# Patient Record
Sex: FEMALE | Race: WHITE | NOT HISPANIC OR LATINO | Employment: FULL TIME | URBAN - METROPOLITAN AREA
[De-identification: names, ages, dates, MRNs, and addresses within clinical notes are randomized per-mention and may not be internally consistent; named-entity substitution may affect disease eponyms.]

---

## 2019-01-23 DIAGNOSIS — Z13.29 SCREENING FOR ENDOCRINE DISORDER: ICD-10-CM

## 2019-01-23 DIAGNOSIS — Z13.6 SCREENING FOR CARDIOVASCULAR CONDITION: Primary | ICD-10-CM

## 2019-01-23 DIAGNOSIS — Z13.220 SCREENING CHOLESTEROL LEVEL: ICD-10-CM

## 2019-01-25 ENCOUNTER — APPOINTMENT (OUTPATIENT)
Dept: LAB | Facility: HOSPITAL | Age: 29
End: 2019-01-25
Payer: COMMERCIAL

## 2019-01-25 DIAGNOSIS — Z13.220 SCREENING CHOLESTEROL LEVEL: ICD-10-CM

## 2019-01-25 DIAGNOSIS — Z13.6 SCREENING FOR CARDIOVASCULAR CONDITION: ICD-10-CM

## 2019-01-25 DIAGNOSIS — Z13.29 SCREENING FOR ENDOCRINE DISORDER: ICD-10-CM

## 2019-01-25 LAB
ALBUMIN SERPL BCP-MCNC: 4.4 G/DL (ref 3–5.2)
ALP SERPL-CCNC: 60 U/L (ref 43–122)
ALT SERPL W P-5'-P-CCNC: 21 U/L (ref 9–52)
ANION GAP SERPL CALCULATED.3IONS-SCNC: 8 MMOL/L (ref 5–14)
AST SERPL W P-5'-P-CCNC: 19 U/L (ref 14–36)
BILIRUB SERPL-MCNC: 0.5 MG/DL
BUN SERPL-MCNC: 12 MG/DL (ref 5–25)
CALCIUM SERPL-MCNC: 9.6 MG/DL (ref 8.4–10.2)
CHLORIDE SERPL-SCNC: 101 MMOL/L (ref 97–108)
CHOLEST SERPL-MCNC: 178 MG/DL
CO2 SERPL-SCNC: 29 MMOL/L (ref 22–30)
CREAT SERPL-MCNC: 0.69 MG/DL (ref 0.6–1.2)
EOSINOPHIL # BLD AUTO: 0.1 THOUSAND/UL (ref 0–0.4)
EOSINOPHIL NFR BLD MANUAL: 2 % (ref 0–6)
ERYTHROCYTE [DISTWIDTH] IN BLOOD BY AUTOMATED COUNT: 13 %
GFR SERPL CREATININE-BSD FRML MDRD: 119 ML/MIN/1.73SQ M
GLUCOSE P FAST SERPL-MCNC: 90 MG/DL (ref 70–99)
HCT VFR BLD AUTO: 42.7 % (ref 36–46)
HDLC SERPL-MCNC: 56 MG/DL (ref 40–59)
HGB BLD-MCNC: 13.9 G/DL (ref 12–16)
LDLC SERPL CALC-MCNC: 107 MG/DL
LYMPHOCYTES # BLD AUTO: 1.28 THOUSAND/UL (ref 0.5–4)
LYMPHOCYTES # BLD AUTO: 25 % (ref 25–45)
MCH RBC QN AUTO: 29.7 PG (ref 26–34)
MCHC RBC AUTO-ENTMCNC: 32.5 G/DL (ref 31–36)
MCV RBC AUTO: 91 FL (ref 80–100)
MONOCYTES # BLD AUTO: 0.66 THOUSAND/UL (ref 0.2–0.9)
MONOCYTES NFR BLD AUTO: 13 % (ref 1–10)
NEUTS SEG # BLD: 3.06 THOUSAND/UL (ref 1.8–7.8)
NEUTS SEG NFR BLD AUTO: 60 %
NONHDLC SERPL-MCNC: 122 MG/DL
PLATELET # BLD AUTO: 233 THOUSANDS/UL (ref 150–450)
PLATELET BLD QL SMEAR: ADEQUATE
PMV BLD AUTO: 8.3 FL (ref 8.9–12.7)
POTASSIUM SERPL-SCNC: 3.8 MMOL/L (ref 3.6–5)
PROT SERPL-MCNC: 7.8 G/DL (ref 5.9–8.4)
RBC # BLD AUTO: 4.67 MILLION/UL (ref 4–5.2)
RBC MORPH BLD: NORMAL
SODIUM SERPL-SCNC: 138 MMOL/L (ref 137–147)
TOTAL CELLS COUNTED SPEC: 100
TRIGL SERPL-MCNC: 74 MG/DL
TSH SERPL DL<=0.05 MIU/L-ACNC: 2.45 UIU/ML (ref 0.47–4.68)
WBC # BLD AUTO: 5.1 THOUSAND/UL (ref 4.5–11)

## 2019-01-25 PROCEDURE — 84443 ASSAY THYROID STIM HORMONE: CPT

## 2019-01-25 PROCEDURE — 85027 COMPLETE CBC AUTOMATED: CPT

## 2019-01-25 PROCEDURE — 36415 COLL VENOUS BLD VENIPUNCTURE: CPT

## 2019-01-25 PROCEDURE — 80061 LIPID PANEL: CPT

## 2019-01-25 PROCEDURE — 85007 BL SMEAR W/DIFF WBC COUNT: CPT

## 2019-01-25 PROCEDURE — 80053 COMPREHEN METABOLIC PANEL: CPT

## 2019-02-05 ENCOUNTER — OFFICE VISIT (OUTPATIENT)
Dept: FAMILY MEDICINE CLINIC | Facility: CLINIC | Age: 29
End: 2019-02-05
Payer: COMMERCIAL

## 2019-02-05 VITALS
HEART RATE: 83 BPM | HEIGHT: 63 IN | WEIGHT: 125 LBS | SYSTOLIC BLOOD PRESSURE: 120 MMHG | OXYGEN SATURATION: 97 % | RESPIRATION RATE: 16 BRPM | BODY MASS INDEX: 22.15 KG/M2 | DIASTOLIC BLOOD PRESSURE: 80 MMHG

## 2019-02-05 DIAGNOSIS — Z23 NEED FOR INFLUENZA VACCINATION: ICD-10-CM

## 2019-02-05 DIAGNOSIS — Z13.31 POSITIVE DEPRESSION SCREENING: ICD-10-CM

## 2019-02-05 DIAGNOSIS — Z12.4 SCREENING FOR MALIGNANT NEOPLASM OF CERVIX: ICD-10-CM

## 2019-02-05 DIAGNOSIS — Z00.01 ENCOUNTER FOR WELL ADULT EXAM WITH ABNORMAL FINDINGS: Primary | ICD-10-CM

## 2019-02-05 PROBLEM — L70.0 ACNE VULGARIS: Status: ACTIVE | Noted: 2017-12-05

## 2019-02-05 PROCEDURE — 99395 PREV VISIT EST AGE 18-39: CPT | Performed by: FAMILY MEDICINE

## 2019-02-05 RX ORDER — POLYMYXIN B SULFATE AND TRIMETHOPRIM 1; 10000 MG/ML; [USP'U]/ML
SOLUTION OPHTHALMIC
Refills: 0 | COMMUNITY
Start: 2019-01-17 | End: 2019-06-07 | Stop reason: ALTCHOICE

## 2019-02-05 RX ORDER — CLINDAMYCIN PHOSPHATE 10 MG/G
GEL TOPICAL EVERY 12 HOURS
COMMUNITY
Start: 2017-12-05 | End: 2019-07-10 | Stop reason: SDUPTHER

## 2019-02-05 NOTE — PROGRESS NOTES
FAMILY PRACTICE HEALTH MAINTENANCE OFFICE VISIT  [facility] - [place] PRIMARY [facility]    NAME: Collette Joseph  AGE: 29 y o  SEX: female  : 1990     DATE: 2019    Assessment and Plan     Problem List Items Addressed This Visit     Encounter for well adult exam with abnormal findings - Primary     Advice and education were given regarding nutrition, aerobic exercises, weight bearing exercises, cardiovascular risk reduction, fall risk reduction, and age appropriate supplements  The patient was counseled regarding instructions for management, risk factor reductions, prognosis, risks and benefits of treatment options, patient and family education, and importance of compliance with treatment  Screening for malignant neoplasm of cervix     Patient declined Pap smear         Positive depression screening     Discussed with the patient behavior therapy and to be refer to psychotherapist she will hold on that she will try to do more exercise and urogram therapy to help her with relaxation           Other Visit Diagnoses     Need for influenza vaccination        Patient declined flu shot            · Patient Counseling:   · Nutrition: Stressed importance of a well balanced diet, moderation of sodium/saturated fat, caloric balance and sufficient intake of fiber  · Exercise: Stressed the importance of regular exercise with a goal of 150 minutes per week  · Dental Health: Discussed daily flossing and brushing and regular dental visits     · Immunizations reviewed Patient is up-to-date with the Tdap she declined a flu shot for today  · Discussed benefits of screening  Patient is up-to-date with the blood work screening for diabetic and hyperlipidemia  BMI Counseling: Body mass index is 22 14 kg/m²  Discussed with patient's BMI with her  The BMI is in the acceptable range          Chief Complaint     Chief Complaint   Patient presents with    Physical Exam yearly        History of Present Illness     Patient and office for her annual physical exam patient deny any chest pain short of breath no palpitation no headache no blurred vision no head deny any dizziness no lateralized of the symptom and no cough no wheezing and deny any runny nose dryness in the eye no discharge from the eye deny any abdomen pain nausea vomiting or diarrhea no renal problem no rash no fever no change in the weight patient is not smoker no and she does not do exercise regularly she does follow regular diet  Depression screening was positive patient and she feel fluctuated in her mood specially in wintertime when it is dark and called outside and she does not feel depressed she does not feel fatigue or tired no sleeping problem no decrease in appetite she been stressed out the about her exam spent last time studying for it        Well Adult Physical   Patient here for a comprehensive physical exam       Diet and Physical Activity  Diet: Regular diet  Weight concerns: None, patient's BMI is between 18 5-24 9  Exercise: rarely      Depression Screen  PHQ-9 Depression Screening    PHQ-9:    Frequency of the following problems over the past two weeks:       Little interest or pleasure in doing things:  3 - nearly every day  Feeling down, depressed, or hopeless:  3 - nearly every day  Trouble falling or staying asleep, or sleeping too much:  1 - several days  Feeling tired or having little energy:  3 - nearly every day  Poor appetite or overeatin - not at all  Feeling bad about yourself - or that you are a failure or have let yourself or your family down:  0 - not at all  Trouble concentrating on things, such as reading the newspaper or watching television:  1 - several days  Moving or speaking so slowly that other people could have noticed   Or the opposite - being so fidgety or restless that you have been moving around a lot more than usual:  0 - not at all  Thoughts that you would be better off dead, or of hurting yourself in some way:  0 - not at all  PHQ-2 Score:  6  PHQ-9 Score:  11          General Health  Hearing: Normal:  bilateral  Vision: previous LASIK surgery  Dental: regular dental visits    Reproductive Health  LMP 01/27/19  No PAP smear yet      The following portions of the patient's history were reviewed and updated as appropriate: allergies, current medications, past family history, past medical history, past social history, past surgical history and problem list     Review of Systems     Review of Systems   Constitutional: Negative for fatigue and fever  HENT: Negative for ear pain, sinus pain, sinus pressure and sore throat  Eyes: Negative for pain and redness  Respiratory: Negative for cough, chest tightness and shortness of breath  Cardiovascular: Negative for chest pain, palpitations and leg swelling  Gastrointestinal: Negative for abdominal pain, blood in stool, constipation, diarrhea and nausea  Endocrine: Negative for heat intolerance  Genitourinary: Negative for flank pain, frequency and hematuria  Musculoskeletal: Negative for back pain and joint swelling  Skin: Negative for rash  Neurological: Negative for dizziness, numbness and headaches  Hematological: Does not bruise/bleed easily  Psychiatric/Behavioral: Negative for agitation and behavioral problems  Past Medical History     History reviewed  No pertinent past medical history      Past Surgical History     Past Surgical History:   Procedure Laterality Date    LASIK Bilateral 01/19/2019       Social History     Social History     Social History    Marital status: Single     Spouse name: N/A    Number of children: N/A    Years of education: N/A     Occupational History    sub teacher-fulltime      [employer]     Social History Main Topics    Smoking status: Never Smoker    Smokeless tobacco: Never Used    Alcohol use Yes    Drug use: No    Sexual activity: Not Currently     Other Topics Concern    None     Social History Narrative    None       Family History     Family History   Problem Relation Age of Onset    Stroke Father        Current Medications       Current Outpatient Prescriptions:     clindamycin (CLINDAGEL) 1 % gel, Every 12 hours, Disp: , Rfl:     polymyxin b-trimethoprim (POLYTRIM) ophthalmic solution, INSTILL 1 DROP INTO BOTH EYES 4 TIMES DAILY, Disp: , Rfl: 0     Allergies     No Known Allergies    Objective     /80 (BP Location: Left arm, Patient Position: Sitting, Cuff Size: Standard)   Pulse 83   Resp 16   Ht 5' 3" (1 6 m)   Wt 56 7 kg (125 lb)   LMP 01/31/2019 (Exact Date)   SpO2 97%   BMI 22 14 kg/m²      Physical Exam   Constitutional: She is oriented to person, place, and time  She appears well-developed and well-nourished  HENT:   Head: Normocephalic  Right Ear: External ear normal    Left Ear: External ear normal    Eyes: Conjunctivae and EOM are normal  Right eye exhibits no discharge  Left eye exhibits no discharge  Neck: No JVD present  Cardiovascular: Normal rate, regular rhythm and normal heart sounds  Exam reveals no gallop  No murmur heard  Pulmonary/Chest: Effort normal  No respiratory distress  She has no wheezes  She has no rales  She exhibits no tenderness  Abdominal: She exhibits no mass  There is no tenderness  There is no rebound  Musculoskeletal: She exhibits no edema or tenderness  Neurological: She is alert and oriented to person, place, and time  Skin: No rash noted  No erythema     Psychiatric: Her behavior is normal          Health Maintenance     Health Maintenance   Topic Date Due    INFLUENZA VACCINE  07/01/2018    Depression Screening PHQ  02/05/2020    DTaP,Tdap,and Td Vaccines (4 - Td) 10/03/2026     Immunization History   Administered Date(s) Administered    Hep B, Adolescent or Pediatric 11/01/1997, 01/10/1998, 06/16/1998    IPV 08/10/2005, 01/04/2006, 06/29/2006    Influenza 10/15/2015, 10/03/2016    MMR 08/10/2005, 01/04/2006    Td (adult), adsorbed 08/10/2005, 01/04/2006    Tdap 06/29/2006, 10/03/2016    Tuberculin Skin Test-PPD Intradermal 10/10/2012, 08/22/2014, 08/26/2016       Anish Boone MD  [address] [place] [address]

## 2019-02-06 NOTE — ASSESSMENT & PLAN NOTE
Discussed with the patient behavior therapy and to be refer to psychotherapist she will hold on that she will try to do more exercise and urogram therapy to help her with relaxation

## 2019-06-07 ENCOUNTER — OFFICE VISIT (OUTPATIENT)
Dept: FAMILY MEDICINE CLINIC | Facility: CLINIC | Age: 29
End: 2019-06-07
Payer: COMMERCIAL

## 2019-06-07 VITALS
DIASTOLIC BLOOD PRESSURE: 70 MMHG | OXYGEN SATURATION: 98 % | BODY MASS INDEX: 22.86 KG/M2 | TEMPERATURE: 98.2 F | HEART RATE: 85 BPM | HEIGHT: 63 IN | WEIGHT: 129 LBS | SYSTOLIC BLOOD PRESSURE: 100 MMHG

## 2019-06-07 DIAGNOSIS — I83.93 SPIDER VEINS OF BOTH LOWER EXTREMITIES: ICD-10-CM

## 2019-06-07 DIAGNOSIS — J30.89 SEASONAL ALLERGIC RHINITIS DUE TO OTHER ALLERGIC TRIGGER: Primary | ICD-10-CM

## 2019-06-07 DIAGNOSIS — B07.8 OTHER VIRAL WARTS: ICD-10-CM

## 2019-06-07 PROCEDURE — 17110 DESTRUCTION B9 LES UP TO 14: CPT | Performed by: FAMILY MEDICINE

## 2019-06-07 PROCEDURE — 99214 OFFICE O/P EST MOD 30 MIN: CPT | Performed by: FAMILY MEDICINE

## 2019-06-07 RX ORDER — FLUTICASONE PROPIONATE 50 MCG
2 SPRAY, SUSPENSION (ML) NASAL DAILY
Qty: 16 G | Refills: 0 | Status: SHIPPED | OUTPATIENT
Start: 2019-06-07 | End: 2020-06-05 | Stop reason: SDUPTHER

## 2019-06-07 RX ORDER — LORATADINE 10 MG/1
10 TABLET ORAL DAILY
Qty: 30 TABLET | Refills: 2 | Status: SHIPPED | OUTPATIENT
Start: 2019-06-07 | End: 2019-09-12 | Stop reason: SDUPTHER

## 2019-06-08 PROBLEM — J30.9 ALLERGIC RHINITIS DUE TO ALLERGEN: Status: ACTIVE | Noted: 2019-06-08

## 2019-06-08 PROBLEM — I83.93 SPIDER VEINS OF BOTH LOWER EXTREMITIES: Status: ACTIVE | Noted: 2019-06-08

## 2019-06-08 PROBLEM — B07.8 OTHER VIRAL WARTS: Status: ACTIVE | Noted: 2019-06-08

## 2019-06-17 ENCOUNTER — OFFICE VISIT (OUTPATIENT)
Dept: FAMILY MEDICINE CLINIC | Facility: CLINIC | Age: 29
End: 2019-06-17
Payer: COMMERCIAL

## 2019-06-17 VITALS
HEIGHT: 63 IN | SYSTOLIC BLOOD PRESSURE: 110 MMHG | DIASTOLIC BLOOD PRESSURE: 70 MMHG | WEIGHT: 128 LBS | HEART RATE: 94 BPM | TEMPERATURE: 98.5 F | OXYGEN SATURATION: 99 % | BODY MASS INDEX: 22.68 KG/M2

## 2019-06-17 DIAGNOSIS — B07.8 OTHER VIRAL WARTS: ICD-10-CM

## 2019-06-17 DIAGNOSIS — F41.0 PANIC ATTACK: Primary | ICD-10-CM

## 2019-06-17 DIAGNOSIS — Z12.4 PAP SMEAR FOR CERVICAL CANCER SCREENING: ICD-10-CM

## 2019-06-17 PROCEDURE — 1036F TOBACCO NON-USER: CPT | Performed by: FAMILY MEDICINE

## 2019-06-17 PROCEDURE — 99214 OFFICE O/P EST MOD 30 MIN: CPT | Performed by: FAMILY MEDICINE

## 2019-06-17 PROCEDURE — 3008F BODY MASS INDEX DOCD: CPT | Performed by: FAMILY MEDICINE

## 2019-06-17 RX ORDER — ALPRAZOLAM 0.25 MG/1
0.25 TABLET ORAL
Qty: 30 TABLET | Refills: 0 | Status: SHIPPED | OUTPATIENT
Start: 2019-06-17 | End: 2020-08-26 | Stop reason: SDUPTHER

## 2019-07-10 DIAGNOSIS — L70.0 ACNE VULGARIS: Primary | ICD-10-CM

## 2019-07-10 RX ORDER — CLINDAMYCIN PHOSPHATE 10 MG/G
GEL TOPICAL 2 TIMES DAILY
Qty: 30 G | Refills: 1 | Status: SHIPPED | OUTPATIENT
Start: 2019-07-10 | End: 2019-09-12 | Stop reason: ALTCHOICE

## 2019-09-12 ENCOUNTER — OFFICE VISIT (OUTPATIENT)
Dept: FAMILY MEDICINE CLINIC | Facility: CLINIC | Age: 29
End: 2019-09-12
Payer: COMMERCIAL

## 2019-09-12 VITALS
DIASTOLIC BLOOD PRESSURE: 60 MMHG | HEIGHT: 64 IN | OXYGEN SATURATION: 98 % | BODY MASS INDEX: 22.36 KG/M2 | HEART RATE: 73 BPM | WEIGHT: 131 LBS | TEMPERATURE: 98.2 F | SYSTOLIC BLOOD PRESSURE: 90 MMHG

## 2019-09-12 DIAGNOSIS — IMO0002 LUMP: Primary | ICD-10-CM

## 2019-09-12 DIAGNOSIS — J30.89 SEASONAL ALLERGIC RHINITIS DUE TO OTHER ALLERGIC TRIGGER: ICD-10-CM

## 2019-09-12 DIAGNOSIS — B07.8 OTHER VIRAL WARTS: ICD-10-CM

## 2019-09-12 PROCEDURE — 3008F BODY MASS INDEX DOCD: CPT | Performed by: FAMILY MEDICINE

## 2019-09-12 PROCEDURE — 99214 OFFICE O/P EST MOD 30 MIN: CPT | Performed by: FAMILY MEDICINE

## 2019-09-12 PROCEDURE — 17110 DESTRUCTION B9 LES UP TO 14: CPT | Performed by: FAMILY MEDICINE

## 2019-09-12 RX ORDER — LORATADINE 10 MG/1
10 TABLET ORAL DAILY
Qty: 30 TABLET | Refills: 2 | Status: SHIPPED | OUTPATIENT
Start: 2019-09-12 | End: 2020-10-05 | Stop reason: SDUPTHER

## 2019-09-12 NOTE — ASSESSMENT & PLAN NOTE
New diagnosis ,plan for US of lump discuss with the patient avoid itchy and so does not get infected or bleed

## 2019-09-12 NOTE — ASSESSMENT & PLAN NOTE
A chronic getting smaller in the size the we discuss Histofreeze the patient she is agree patient tolerated well without any complication

## 2019-09-20 ENCOUNTER — HOSPITAL ENCOUNTER (OUTPATIENT)
Dept: ULTRASOUND IMAGING | Facility: HOSPITAL | Age: 29
Discharge: HOME | End: 2019-09-20
Payer: COMMERCIAL

## 2019-09-20 DIAGNOSIS — IMO0002 LUMP: ICD-10-CM

## 2019-09-20 PROCEDURE — 76536 US EXAM OF HEAD AND NECK: CPT

## 2019-09-23 ENCOUNTER — TELEPHONE (OUTPATIENT)
Dept: FAMILY MEDICINE CLINIC | Facility: CLINIC | Age: 29
End: 2019-09-23

## 2019-09-23 DIAGNOSIS — L72.3 SEBACEOUS CYST: Primary | ICD-10-CM

## 2019-09-23 NOTE — TELEPHONE ENCOUNTER
----- Message from Doraine Buerger, MD sent at 9/22/2019  3:47 PM EDT -----  Refer to Surgeon for remove sebaceous cysts on scalp

## 2019-09-23 NOTE — TELEPHONE ENCOUNTER
Spoke with patient states she would prefer not to see the surgeon at this time will wait to see if it gets bigger because right now it has gotten smaller

## 2019-09-23 NOTE — TELEPHONE ENCOUNTER
left jeffrye for patient to return call regarding results   Order placed for Surgeon for sebaceous cyst on scalp

## 2019-10-22 ENCOUNTER — TELEPHONE (OUTPATIENT)
Dept: FAMILY MEDICINE CLINIC | Facility: CLINIC | Age: 29
End: 2019-10-22

## 2019-10-22 DIAGNOSIS — Z11.1 SCREENING FOR TUBERCULOSIS: Primary | ICD-10-CM

## 2019-10-22 NOTE — TELEPHONE ENCOUNTER
spoke with patient states she is starting a new employment needs a TB test done and a form filled out advised order will be placed and she can bring form in to office

## 2019-11-06 ENCOUNTER — TELEPHONE (OUTPATIENT)
Dept: FAMILY MEDICINE CLINIC | Facility: CLINIC | Age: 29
End: 2019-11-06

## 2019-11-07 ENCOUNTER — CONSULT (OUTPATIENT)
Dept: SURGERY | Facility: CLINIC | Age: 29
End: 2019-11-07
Payer: COMMERCIAL

## 2019-11-07 ENCOUNTER — APPOINTMENT (OUTPATIENT)
Dept: LAB | Facility: HOSPITAL | Age: 29
End: 2019-11-07
Payer: COMMERCIAL

## 2019-11-07 VITALS
TEMPERATURE: 97.6 F | BODY MASS INDEX: 23.5 KG/M2 | SYSTOLIC BLOOD PRESSURE: 118 MMHG | HEIGHT: 63 IN | WEIGHT: 132.6 LBS | DIASTOLIC BLOOD PRESSURE: 78 MMHG | HEART RATE: 99 BPM

## 2019-11-07 DIAGNOSIS — Z11.1 SCREENING FOR TUBERCULOSIS: ICD-10-CM

## 2019-11-07 DIAGNOSIS — F41.0 PANIC ATTACK: Primary | ICD-10-CM

## 2019-11-07 DIAGNOSIS — L72.3 SEBACEOUS CYST: Primary | ICD-10-CM

## 2019-11-07 PROCEDURE — 21011 EXC FACE LES SC <2 CM: CPT | Performed by: SURGERY

## 2019-11-07 PROCEDURE — 86480 TB TEST CELL IMMUN MEASURE: CPT

## 2019-11-07 PROCEDURE — 36415 COLL VENOUS BLD VENIPUNCTURE: CPT

## 2019-11-07 PROCEDURE — 99203 OFFICE O/P NEW LOW 30 MIN: CPT | Performed by: SURGERY

## 2019-11-08 LAB
GAMMA INTERFERON BACKGROUND BLD IA-ACNC: 0.03 IU/ML
M TB IFN-G BLD-IMP: NEGATIVE
M TB IFN-G CD4+ BCKGRND COR BLD-ACNC: 0 IU/ML
M TB IFN-G CD4+ BCKGRND COR BLD-ACNC: 0 IU/ML
MITOGEN IGNF BCKGRD COR BLD-ACNC: >10 IU/ML

## 2019-11-08 NOTE — TELEPHONE ENCOUNTER
Left message on machine referral entered for behavioral health number left to schedule patient can also contact insurance for participating provider

## 2019-11-08 NOTE — PROGRESS NOTES
Assessment/Plan:    Sebaceous cyst  - excision done in office    - follow in 1-2 weeks   -  Wound instructions given  Subjective:      Patient ID: Ector Perera is a 34 y o  female with 2 palpable masses on her head that she noticed over the summer  They have progressively since that time and now hurt when she barcenas her hair  No other symptoms at this time  HPI    The following portions of the patient's history were reviewed and updated as appropriate: allergies, current medications, past family history, past medical history, past social history, past surgical history and problem list     Review of Systems   Constitutional: Negative  HENT:        As noted in HPI   Eyes: Negative  Respiratory: Negative  Gastrointestinal: Negative  Genitourinary: Negative  Musculoskeletal: Negative  Objective:      /78   Pulse 99   Temp 97 6 °F (36 4 °C) (Tympanic)   Ht 5' 3" (1 6 m)   Wt 60 1 kg (132 lb 9 6 oz)   BMI 23 49 kg/m²          Physical Exam   Constitutional: She appears well-nourished  No distress  HENT:   1 cm cyst on left occipital scalp - minimally tender, mobile, rubbery  0 75 cm cyst on right frontoparietal scalp - minimally tender, mobile, rubbery   Eyes: Pupils are equal, round, and reactive to light  EOM are normal    Neck: Neck supple  No tracheal deviation present  No thyromegaly present  Cardiovascular: Normal rate and intact distal pulses  Pulmonary/Chest: Effort normal  No stridor  No respiratory distress  The patient was placed prone   The head was prepped and draped in standard fashion  Local anesthesia was used to anesthetize the skin surrounding a 2 lesions on scalp  The left occipital lesion was 1 cm and the right frontoparietal lesion was   A transverse incision was made over both  lesions  Sharp and blunt dissection,Using scissors, knife, and cautery, were used to mobilize the mass which was in a subcutaneous location   The masses were easily enucleated with their capsules intact    Hemostasis was achieved with direct pressure  The wound was irrigated  The wounds wer closed  a 4-0 Monocryl subcuticular stitch  The specimen sizes:  #1  - Left occipital scalp  - 1x1 cm  #2  - Right frontoparietal scalp - 0 75 x 0 5 cm  The specimens were not sent to pathology as they were clearly benign sebaceous cysts    At the end of the operation, all sponge, instrument, and needle counts were correct

## 2019-11-22 ENCOUNTER — OFFICE VISIT (OUTPATIENT)
Dept: SURGERY | Facility: CLINIC | Age: 29
End: 2019-11-22

## 2019-11-22 VITALS — TEMPERATURE: 96.6 F | HEIGHT: 63 IN | WEIGHT: 134 LBS | HEART RATE: 81 BPM | BODY MASS INDEX: 23.74 KG/M2

## 2019-11-22 DIAGNOSIS — L72.9 SCALP CYST: Primary | ICD-10-CM

## 2019-11-22 PROCEDURE — 99024 POSTOP FOLLOW-UP VISIT: CPT | Performed by: SURGERY

## 2019-11-22 NOTE — PROGRESS NOTES
Office Visit - General Surgery  Ashia Mike MRN: [id number]  Encounter: [id number]    Assessment and Plan    Problem List Items Addressed This Visit        Musculoskeletal and Integument    Scalp cyst - Primary     S/p excision of scalp cyst x2    Healing well  Ok to shower / wash hair  Follow-up PRN               Chief Complaint:  Ashia Mike is a 34 y o  female who presents for Suture / Staple Removal (Suture removal from scalp)    Subjective  29F s/p excision of scalp masses x2 on 11/7 in the office  Pt doing well, no complaints  Past Medical History  Past Medical History:   Diagnosis Date    Panic attack 6/17/2019       Past Surgical History  Past Surgical History:   Procedure Laterality Date    LASIK Bilateral 01/19/2019       Family History  Family History   Problem Relation Age of Onset    Stroke Father        Social History  Social History     Socioeconomic History    Marital status: Single     Spouse name: None    Number of children: None    Years of education: None    Highest education level: None   Occupational History    Occupation: sub teacher-fulltime     Comment: employer[employer]   Social Needs    Financial resource strain: None    Food insecurity:     Worry: None     Inability: None    Transportation needs:     Medical: None     Non-medical: None   Tobacco Use    Smoking status: Never Smoker    Smokeless tobacco: Never Used   Substance and Sexual Activity    Alcohol use:  Yes    Drug use: No    Sexual activity: Not Currently   Lifestyle    Physical activity:     Days per week: None     Minutes per session: None    Stress: None   Relationships    Social connections:     Talks on phone: None     Gets together: None     Attends Roman [employer] service: None     Active member of club or organization: None     Attends meetings of clubs or organizations: None     Relationship status: None    Intimate partner violence:     Fear of current or ex partner: None     Emotionally abused: None Physically abused: None     Forced sexual activity: None   Other Topics Concern    None   Social History Narrative    None        Medications  Current Outpatient Medications on File Prior to Visit   Medication Sig Dispense Refill    ALPRAZolam (XANAX) 0 25 mg tablet Take 1 tablet (0 25 mg total) by mouth daily at bedtime as needed for anxiety 30 tablet 0    fluticasone (FLONASE) 50 mcg/act nasal spray 2 sprays into each nostril daily 16 g 0    loratadine (CLARITIN) 10 mg tablet Take 1 tablet (10 mg total) by mouth daily 30 tablet 2     No current facility-administered medications on file prior to visit          Allergies  No Known Allergies    Review of Systems  As per hpi    Objective  Vitals:    11/22/19 1001   Pulse: 81   Temp: (!) 96 6 °F (35 9 °C)       Physical Exam   NAD, aox4  Incisions x2 healing well without erythema/drainage

## 2020-01-20 NOTE — PROGRESS NOTES
Subjective:   Chief Complaint   Patient presents with    Other     bump/pimple on scalp        Patient ID: Judi Patricia is a 34 y o  female  Patient and office concerned about the lump on her scalp area and this lump comes and goes get bigger and smaller it has been going on for more than 6 months it is not painful not red the the but she feel it when she come her hair deny any head trauma and no fever no blurred vision no weakness no headache no numbness no lateralized of the symptom and no rash  Patient also concerned about the wart on her left forearm and she did have a treatment Histofreeze it get smaller but still there it is not painful and no change in the size but the get the irritated the with her jewelry and will watch      The following portions of the patient's history were reviewed and updated as appropriate: allergies, current medications, past family history, past medical history, past social history, past surgical history and problem list     Review of Systems   Constitutional: Negative for fatigue and fever  HENT: Negative for ear pain, sinus pressure, sinus pain and sore throat  Eyes: Negative for pain and redness  Respiratory: Negative for cough, chest tightness and shortness of breath  Cardiovascular: Negative for chest pain, palpitations and leg swelling  Gastrointestinal: Negative for abdominal pain, blood in stool, constipation, diarrhea and nausea  Genitourinary: Negative for flank pain, frequency and hematuria  Musculoskeletal: Negative for back pain and joint swelling  Skin: Negative for rash  Lump on the scalp  Wart on the left forearm   Neurological: Negative for dizziness, numbness and headaches  Hematological: Does not bruise/bleed easily           Objective:  Vitals:    09/12/19 1457   BP: 90/60   Pulse: 73   Temp: 98 2 °F (36 8 °C)   TempSrc: Tympanic   SpO2: 98%   Weight: 59 4 kg (131 lb)   Height: 5' 3 5" (1 613 m)      Physical Exam Constitutional: She is oriented to person, place, and time  She appears well-developed and well-nourished  HENT:   Head: Normocephalic  Right Ear: External ear normal    Left Ear: External ear normal    Eyes: Conjunctivae and EOM are normal  Right eye exhibits no discharge  Left eye exhibits no discharge  Neck: No JVD present  Cardiovascular: Normal rate, regular rhythm and normal heart sounds  Exam reveals no gallop  No murmur heard  Pulmonary/Chest: Effort normal  No respiratory distress  She has no wheezes  She has no rales  She exhibits no tenderness  Abdominal: She exhibits no mass  There is no tenderness  There is no rebound  Musculoskeletal: She exhibits no edema or tenderness  Neurological: She is alert and oriented to person, place, and time  Skin: No rash noted  No erythema  Assessment/Plan:    Lump  New diagnosis ,plan for US of lump discuss with the patient avoid itchy and so does not get infected or bleed    Other viral warts  A chronic getting smaller in the size the we discuss Histofreeze the patient she is agree patient tolerated well without any complication    Lesion Destruction  Date/Time: 9/12/2019 3:27 PM  Performed by: Nick Brannon MD  Authorized by: Nick Brannon MD     Procedure Details - Lesion Destruction:     Number of Lesions:  2  Lesion 1:     Body area:  Upper extremity    Upper extremity location:  L lower arm    Initial size (mm):  2    Malignancy: benign lesion      Destruction method: cryotherapy    Lesion 2:     Body area:  Upper extremity    Upper extremity location:  L upper arm    Initial size (mm):  1    Malignancy: benign lesion         Diagnoses and all orders for this visit:    Lump  -     US head neck soft tissue; Future    Seasonal allergic rhinitis due to other allergic trigger  -     loratadine (CLARITIN) 10 mg tablet;  Take 1 tablet (10 mg total) by mouth daily    Other viral warts    Other orders  -     Lesion Destruction Yes

## 2020-02-17 ENCOUNTER — TELEPHONE (OUTPATIENT)
Dept: FAMILY MEDICINE CLINIC | Facility: CLINIC | Age: 30
End: 2020-02-17

## 2020-02-17 NOTE — LETTER
February 17, 2020     Linh Pawan   [address] [facility] [address]      Dear Ms Acevedo: In addition to helping you feel better when you are sick, we are interested in preventing illness and injury in the first place  In the spirit of maintaining your good health, our system indicates that you are due for the following:    Physical Exam       Please call us to make an appointment at your earliest convenience  I look forward to seeing you soon          Sincerely,         Branden Amanda MD

## 2020-04-02 ENCOUNTER — TELEPHONE (OUTPATIENT)
Dept: FAMILY MEDICINE CLINIC | Facility: CLINIC | Age: 30
End: 2020-04-02

## 2020-05-19 ENCOUNTER — TELEPHONE (OUTPATIENT)
Dept: FAMILY MEDICINE CLINIC | Facility: CLINIC | Age: 30
End: 2020-05-19

## 2020-06-01 ENCOUNTER — TELEPHONE (OUTPATIENT)
Dept: FAMILY MEDICINE CLINIC | Facility: CLINIC | Age: 30
End: 2020-06-01

## 2020-06-01 DIAGNOSIS — Z00.00 ROUTINE ADULT HEALTH MAINTENANCE: ICD-10-CM

## 2020-06-01 DIAGNOSIS — Z13.6 SCREENING FOR HYPERTENSION: ICD-10-CM

## 2020-06-01 DIAGNOSIS — Z13.220 SCREENING CHOLESTEROL LEVEL: ICD-10-CM

## 2020-06-01 DIAGNOSIS — Z13.6 SCREENING FOR CARDIOVASCULAR CONDITION: Primary | ICD-10-CM

## 2020-06-01 DIAGNOSIS — Z13.1 ENCOUNTER FOR SCREENING FOR DIABETES MELLITUS: ICD-10-CM

## 2020-06-02 ENCOUNTER — APPOINTMENT (OUTPATIENT)
Dept: LAB | Facility: HOSPITAL | Age: 30
End: 2020-06-02
Payer: COMMERCIAL

## 2020-06-02 DIAGNOSIS — Z13.6 SCREENING FOR HYPERTENSION: ICD-10-CM

## 2020-06-02 DIAGNOSIS — Z00.00 ROUTINE ADULT HEALTH MAINTENANCE: ICD-10-CM

## 2020-06-02 DIAGNOSIS — Z13.6 SCREENING FOR CARDIOVASCULAR CONDITION: ICD-10-CM

## 2020-06-02 DIAGNOSIS — Z13.220 SCREENING CHOLESTEROL LEVEL: ICD-10-CM

## 2020-06-02 DIAGNOSIS — Z13.1 ENCOUNTER FOR SCREENING FOR DIABETES MELLITUS: ICD-10-CM

## 2020-06-02 LAB
ANION GAP SERPL CALCULATED.3IONS-SCNC: 3 MMOL/L (ref 4–13)
BASOPHILS # BLD AUTO: 0.04 THOUSANDS/ΜL (ref 0–0.1)
BASOPHILS NFR BLD AUTO: 1 % (ref 0–1)
BUN SERPL-MCNC: 12 MG/DL (ref 5–25)
CALCIUM SERPL-MCNC: 9.6 MG/DL (ref 8.3–10.1)
CHLORIDE SERPL-SCNC: 108 MMOL/L (ref 100–108)
CO2 SERPL-SCNC: 25 MMOL/L (ref 21–32)
CREAT SERPL-MCNC: 0.68 MG/DL (ref 0.6–1.3)
EOSINOPHIL # BLD AUTO: 0.17 THOUSAND/ΜL (ref 0–0.61)
EOSINOPHIL NFR BLD AUTO: 3 % (ref 0–6)
ERYTHROCYTE [DISTWIDTH] IN BLOOD BY AUTOMATED COUNT: 12.5 % (ref 11.6–15.1)
GFR SERPL CREATININE-BSD FRML MDRD: 118 ML/MIN/1.73SQ M
GLUCOSE P FAST SERPL-MCNC: 84 MG/DL (ref 65–99)
HCT VFR BLD AUTO: 39.4 % (ref 34.8–46.1)
HGB BLD-MCNC: 12.7 G/DL (ref 11.5–15.4)
IMM GRANULOCYTES # BLD AUTO: 0.01 THOUSAND/UL (ref 0–0.2)
IMM GRANULOCYTES NFR BLD AUTO: 0 % (ref 0–2)
LYMPHOCYTES # BLD AUTO: 1.56 THOUSANDS/ΜL (ref 0.6–4.47)
LYMPHOCYTES NFR BLD AUTO: 26 % (ref 14–44)
MCH RBC QN AUTO: 29.3 PG (ref 26.8–34.3)
MCHC RBC AUTO-ENTMCNC: 32.2 G/DL (ref 31.4–37.4)
MCV RBC AUTO: 91 FL (ref 82–98)
MONOCYTES # BLD AUTO: 0.42 THOUSAND/ΜL (ref 0.17–1.22)
MONOCYTES NFR BLD AUTO: 7 % (ref 4–12)
NEUTROPHILS # BLD AUTO: 3.89 THOUSANDS/ΜL (ref 1.85–7.62)
NEUTS SEG NFR BLD AUTO: 63 % (ref 43–75)
NRBC BLD AUTO-RTO: 0 /100 WBCS
PLATELET # BLD AUTO: 224 THOUSANDS/UL (ref 149–390)
PMV BLD AUTO: 10.2 FL (ref 8.9–12.7)
POTASSIUM SERPL-SCNC: 3.8 MMOL/L (ref 3.5–5.3)
RBC # BLD AUTO: 4.33 MILLION/UL (ref 3.81–5.12)
SODIUM SERPL-SCNC: 136 MMOL/L (ref 136–145)
WBC # BLD AUTO: 6.09 THOUSAND/UL (ref 4.31–10.16)

## 2020-06-02 PROCEDURE — 36415 COLL VENOUS BLD VENIPUNCTURE: CPT

## 2020-06-02 PROCEDURE — 80048 BASIC METABOLIC PNL TOTAL CA: CPT

## 2020-06-02 PROCEDURE — 85025 COMPLETE CBC W/AUTO DIFF WBC: CPT

## 2020-06-05 ENCOUNTER — APPOINTMENT (OUTPATIENT)
Dept: LAB | Facility: MEDICAL CENTER | Age: 30
End: 2020-06-05
Payer: COMMERCIAL

## 2020-06-05 ENCOUNTER — OFFICE VISIT (OUTPATIENT)
Dept: FAMILY MEDICINE CLINIC | Facility: CLINIC | Age: 30
End: 2020-06-05
Payer: COMMERCIAL

## 2020-06-05 VITALS
WEIGHT: 124 LBS | HEART RATE: 93 BPM | DIASTOLIC BLOOD PRESSURE: 64 MMHG | TEMPERATURE: 98.1 F | HEIGHT: 63 IN | SYSTOLIC BLOOD PRESSURE: 108 MMHG | BODY MASS INDEX: 21.97 KG/M2 | OXYGEN SATURATION: 96 % | RESPIRATION RATE: 16 BRPM

## 2020-06-05 DIAGNOSIS — R53.83 OTHER FATIGUE: ICD-10-CM

## 2020-06-05 DIAGNOSIS — F41.0 PANIC ATTACK: ICD-10-CM

## 2020-06-05 DIAGNOSIS — J30.89 SEASONAL ALLERGIC RHINITIS DUE TO OTHER ALLERGIC TRIGGER: ICD-10-CM

## 2020-06-05 DIAGNOSIS — Z00.01 ENCOUNTER FOR WELL ADULT EXAM WITH ABNORMAL FINDINGS: Primary | ICD-10-CM

## 2020-06-05 PROBLEM — Z00.00 ROUTINE MEDICAL EXAM: Status: ACTIVE | Noted: 2020-06-05

## 2020-06-05 PROBLEM — Z13.31 POSITIVE DEPRESSION SCREENING: Status: RESOLVED | Noted: 2019-02-05 | Resolved: 2020-06-05

## 2020-06-05 LAB
25(OH)D3 SERPL-MCNC: 16.4 NG/ML (ref 30–100)
TSH SERPL DL<=0.05 MIU/L-ACNC: 0.96 UIU/ML (ref 0.36–3.74)
VIT B12 SERPL-MCNC: 542 PG/ML (ref 100–900)

## 2020-06-05 PROCEDURE — 1036F TOBACCO NON-USER: CPT | Performed by: FAMILY MEDICINE

## 2020-06-05 PROCEDURE — 86617 LYME DISEASE ANTIBODY: CPT | Performed by: FAMILY MEDICINE

## 2020-06-05 PROCEDURE — 99214 OFFICE O/P EST MOD 30 MIN: CPT | Performed by: FAMILY MEDICINE

## 2020-06-05 PROCEDURE — 82607 VITAMIN B-12: CPT

## 2020-06-05 PROCEDURE — 99395 PREV VISIT EST AGE 18-39: CPT | Performed by: FAMILY MEDICINE

## 2020-06-05 PROCEDURE — 84443 ASSAY THYROID STIM HORMONE: CPT

## 2020-06-05 PROCEDURE — 3008F BODY MASS INDEX DOCD: CPT | Performed by: FAMILY MEDICINE

## 2020-06-05 PROCEDURE — 82306 VITAMIN D 25 HYDROXY: CPT

## 2020-06-05 PROCEDURE — 86038 ANTINUCLEAR ANTIBODIES: CPT | Performed by: FAMILY MEDICINE

## 2020-06-05 PROCEDURE — 86618 LYME DISEASE ANTIBODY: CPT | Performed by: FAMILY MEDICINE

## 2020-06-05 PROCEDURE — 36415 COLL VENOUS BLD VENIPUNCTURE: CPT | Performed by: FAMILY MEDICINE

## 2020-06-05 RX ORDER — FLUTICASONE PROPIONATE 50 MCG
2 SPRAY, SUSPENSION (ML) NASAL DAILY
Qty: 16 G | Refills: 2 | Status: SHIPPED | OUTPATIENT
Start: 2020-06-05 | End: 2020-10-05 | Stop reason: SDUPTHER

## 2020-06-06 PROBLEM — R53.83 OTHER FATIGUE: Status: ACTIVE | Noted: 2020-06-06

## 2020-06-06 PROBLEM — Z00.01 ENCOUNTER FOR WELL ADULT EXAM WITH ABNORMAL FINDINGS: Status: ACTIVE | Noted: 2020-06-05

## 2020-06-06 PROBLEM — Z00.01 ENCOUNTER FOR WELL ADULT EXAM WITH ABNORMAL FINDINGS: Status: ACTIVE | Noted: 2020-06-06

## 2020-06-06 PROBLEM — R53.83 OTHER FATIGUE: Status: ACTIVE | Noted: 2020-06-05

## 2020-06-08 ENCOUNTER — TELEPHONE (OUTPATIENT)
Dept: FAMILY MEDICINE CLINIC | Facility: CLINIC | Age: 30
End: 2020-06-08

## 2020-06-08 LAB
B BURGDOR IGG PATRN SER IB-IMP: NEGATIVE
B BURGDOR IGG+IGM SER-ACNC: 0.96 ISR (ref 0–0.9)
B BURGDOR IGM PATRN SER IB-IMP: NEGATIVE
B BURGDOR18KD IGG SER QL IB: NORMAL
B BURGDOR23KD IGG SER QL IB: NORMAL
B BURGDOR23KD IGM SER QL IB: NORMAL
B BURGDOR28KD IGG SER QL IB: NORMAL
B BURGDOR30KD IGG SER QL IB: NORMAL
B BURGDOR39KD IGG SER QL IB: NORMAL
B BURGDOR39KD IGM SER QL IB: NORMAL
B BURGDOR41KD IGG SER QL IB: NORMAL
B BURGDOR41KD IGM SER QL IB: NORMAL
B BURGDOR45KD IGG SER QL IB: NORMAL
B BURGDOR58KD IGG SER QL IB: NORMAL
B BURGDOR66KD IGG SER QL IB: NORMAL
B BURGDOR93KD IGG SER QL IB: NORMAL
RYE IGE QN: NEGATIVE

## 2020-06-09 DIAGNOSIS — E55.9 VITAMIN D DEFICIENCY: Primary | ICD-10-CM

## 2020-06-09 RX ORDER — ERGOCALCIFEROL 1.25 MG/1
50000 CAPSULE ORAL WEEKLY
Qty: 12 CAPSULE | Refills: 0 | Status: SHIPPED | OUTPATIENT
Start: 2020-06-09 | End: 2020-11-03 | Stop reason: ALTCHOICE

## 2020-07-15 ENCOUNTER — TELEPHONE (OUTPATIENT)
Dept: FAMILY MEDICINE CLINIC | Facility: CLINIC | Age: 30
End: 2020-07-15

## 2020-07-15 NOTE — TELEPHONE ENCOUNTER
pc from the business office stating they needed additional codes for the patients lab work that was drawn on 6/5/2020  States the b12 and vitamin d is not covering, I reviewed the notes and advised them we do not have any thing additional unfortunately that can help with the passing of these labs

## 2020-08-25 ENCOUNTER — TELEPHONE (OUTPATIENT)
Dept: FAMILY MEDICINE CLINIC | Facility: CLINIC | Age: 30
End: 2020-08-25

## 2020-08-25 DIAGNOSIS — E55.9 VITAMIN D DEFICIENCY: Primary | ICD-10-CM

## 2020-08-25 RX ORDER — MULTIVIT-MIN/IRON/FOLIC ACID/K 18-600-40
1 CAPSULE ORAL DAILY
Qty: 30 CAPSULE | Refills: 2 | Status: SHIPPED | OUTPATIENT
Start: 2020-08-25 | End: 2020-12-19

## 2020-08-26 DIAGNOSIS — F41.0 PANIC ATTACK: ICD-10-CM

## 2020-08-26 RX ORDER — ALPRAZOLAM 0.25 MG/1
0.25 TABLET ORAL
Qty: 10 TABLET | Refills: 0 | Status: SHIPPED | OUTPATIENT
Start: 2020-08-26 | End: 2021-07-14 | Stop reason: DRUGHIGH

## 2020-08-26 NOTE — TELEPHONE ENCOUNTER
Incoming request for only ten pills she only takes it as needed original script gave her 30 which  she only requesting 10

## 2020-08-26 NOTE — TELEPHONE ENCOUNTER
06/17/2019  1   06/17/2019  Alprazolam 0 25 MG Tablet  30 00 30 [facility]   [id number]   Pen ([id number])   0   [place]   [place]   01/17/2019  1   01/17/2019  Diazepam 5 MG Tablet  2 00 1 [facility]   [id number]   Pen ([id number])   0   Comm Ins

## 2020-10-02 ENCOUNTER — NURSE TRIAGE (OUTPATIENT)
Dept: OTHER | Facility: OTHER | Age: 30
End: 2020-10-02

## 2020-10-02 DIAGNOSIS — Z20.828 SARS-ASSOCIATED CORONAVIRUS EXPOSURE: ICD-10-CM

## 2020-10-02 DIAGNOSIS — Z20.828 SARS-ASSOCIATED CORONAVIRUS EXPOSURE: Primary | ICD-10-CM

## 2020-10-02 PROCEDURE — U0003 INFECTIOUS AGENT DETECTION BY NUCLEIC ACID (DNA OR RNA); SEVERE ACUTE RESPIRATORY SYNDROME CORONAVIRUS 2 (SARS-COV-2) (CORONAVIRUS DISEASE [COVID-19]), AMPLIFIED PROBE TECHNIQUE, MAKING USE OF HIGH THROUGHPUT TECHNOLOGIES AS DESCRIBED BY CMS-2020-01-R: HCPCS | Performed by: FAMILY MEDICINE

## 2020-10-03 LAB — SARS-COV-2 RNA SPEC QL NAA+PROBE: NOT DETECTED

## 2020-10-05 ENCOUNTER — TELEPHONE (OUTPATIENT)
Dept: FAMILY MEDICINE CLINIC | Facility: CLINIC | Age: 30
End: 2020-10-05

## 2020-10-05 DIAGNOSIS — J30.89 SEASONAL ALLERGIC RHINITIS DUE TO OTHER ALLERGIC TRIGGER: ICD-10-CM

## 2020-10-05 RX ORDER — FLUTICASONE PROPIONATE 50 MCG
2 SPRAY, SUSPENSION (ML) NASAL DAILY
Qty: 16 G | Refills: 2 | Status: SHIPPED | OUTPATIENT
Start: 2020-10-05 | End: 2021-07-07

## 2020-10-05 RX ORDER — LORATADINE 10 MG/1
10 TABLET ORAL DAILY
Qty: 30 TABLET | Refills: 2 | Status: SHIPPED | OUTPATIENT
Start: 2020-10-05 | End: 2021-07-07 | Stop reason: ALTCHOICE

## 2020-11-03 ENCOUNTER — OFFICE VISIT (OUTPATIENT)
Dept: FAMILY MEDICINE CLINIC | Facility: CLINIC | Age: 30
End: 2020-11-03
Payer: COMMERCIAL

## 2020-11-03 VITALS
WEIGHT: 130 LBS | OXYGEN SATURATION: 97 % | TEMPERATURE: 98.1 F | HEART RATE: 84 BPM | BODY MASS INDEX: 23.04 KG/M2 | SYSTOLIC BLOOD PRESSURE: 120 MMHG | HEIGHT: 63 IN | DIASTOLIC BLOOD PRESSURE: 80 MMHG

## 2020-11-03 DIAGNOSIS — Z13.220 SCREENING CHOLESTEROL LEVEL: ICD-10-CM

## 2020-11-03 DIAGNOSIS — R53.83 OTHER FATIGUE: ICD-10-CM

## 2020-11-03 DIAGNOSIS — B07.8 OTHER VIRAL WARTS: ICD-10-CM

## 2020-11-03 DIAGNOSIS — E55.9 VITAMIN D DEFICIENCY: ICD-10-CM

## 2020-11-03 DIAGNOSIS — K58.0 IRRITABLE BOWEL SYNDROME WITH DIARRHEA: Primary | ICD-10-CM

## 2020-11-03 DIAGNOSIS — Z28.21 IMMUNIZATION REFUSED: ICD-10-CM

## 2020-11-03 PROCEDURE — 99214 OFFICE O/P EST MOD 30 MIN: CPT | Performed by: FAMILY MEDICINE

## 2020-11-03 PROCEDURE — 3008F BODY MASS INDEX DOCD: CPT | Performed by: FAMILY MEDICINE

## 2020-11-03 PROCEDURE — 3725F SCREEN DEPRESSION PERFORMED: CPT | Performed by: FAMILY MEDICINE

## 2020-11-03 RX ORDER — DICYCLOMINE HYDROCHLORIDE 10 MG/1
10 CAPSULE ORAL
Qty: 60 CAPSULE | Refills: 2 | Status: SHIPPED | OUTPATIENT
Start: 2020-11-03 | End: 2020-11-09 | Stop reason: SDUPTHER

## 2020-11-04 ENCOUNTER — APPOINTMENT (OUTPATIENT)
Dept: LAB | Facility: CLINIC | Age: 30
End: 2020-11-04
Payer: COMMERCIAL

## 2020-11-04 DIAGNOSIS — K58.0 IRRITABLE BOWEL SYNDROME WITH DIARRHEA: ICD-10-CM

## 2020-11-04 DIAGNOSIS — Z13.220 SCREENING CHOLESTEROL LEVEL: ICD-10-CM

## 2020-11-04 DIAGNOSIS — R53.83 OTHER FATIGUE: ICD-10-CM

## 2020-11-04 DIAGNOSIS — E55.9 VITAMIN D DEFICIENCY: ICD-10-CM

## 2020-11-04 LAB
25(OH)D3 SERPL-MCNC: 35.4 NG/ML (ref 30–100)
ALBUMIN SERPL BCP-MCNC: 3.8 G/DL (ref 3.5–5)
ALP SERPL-CCNC: 70 U/L (ref 46–116)
ALT SERPL W P-5'-P-CCNC: 19 U/L (ref 12–78)
ANION GAP SERPL CALCULATED.3IONS-SCNC: 5 MMOL/L (ref 4–13)
AST SERPL W P-5'-P-CCNC: 12 U/L (ref 5–45)
BASOPHILS # BLD AUTO: 0.03 THOUSANDS/ΜL (ref 0–0.1)
BASOPHILS NFR BLD AUTO: 1 % (ref 0–1)
BILIRUB SERPL-MCNC: 0.48 MG/DL (ref 0.2–1)
BUN SERPL-MCNC: 10 MG/DL (ref 5–25)
CALCIUM SERPL-MCNC: 9.2 MG/DL (ref 8.3–10.1)
CHLORIDE SERPL-SCNC: 110 MMOL/L (ref 100–108)
CHOLEST SERPL-MCNC: 163 MG/DL (ref 50–200)
CO2 SERPL-SCNC: 25 MMOL/L (ref 21–32)
CREAT SERPL-MCNC: 0.73 MG/DL (ref 0.6–1.3)
EOSINOPHIL # BLD AUTO: 0.17 THOUSAND/ΜL (ref 0–0.61)
EOSINOPHIL NFR BLD AUTO: 3 % (ref 0–6)
ERYTHROCYTE [DISTWIDTH] IN BLOOD BY AUTOMATED COUNT: 12.4 % (ref 11.6–15.1)
GFR SERPL CREATININE-BSD FRML MDRD: 111 ML/MIN/1.73SQ M
GLUCOSE P FAST SERPL-MCNC: 85 MG/DL (ref 65–99)
HCT VFR BLD AUTO: 39.5 % (ref 34.8–46.1)
HDLC SERPL-MCNC: 66 MG/DL
HGB BLD-MCNC: 13.1 G/DL (ref 11.5–15.4)
IMM GRANULOCYTES # BLD AUTO: 0.02 THOUSAND/UL (ref 0–0.2)
IMM GRANULOCYTES NFR BLD AUTO: 0 % (ref 0–2)
LDLC SERPL CALC-MCNC: 82 MG/DL (ref 0–100)
LYMPHOCYTES # BLD AUTO: 1.57 THOUSANDS/ΜL (ref 0.6–4.47)
LYMPHOCYTES NFR BLD AUTO: 25 % (ref 14–44)
MCH RBC QN AUTO: 30.2 PG (ref 26.8–34.3)
MCHC RBC AUTO-ENTMCNC: 33.2 G/DL (ref 31.4–37.4)
MCV RBC AUTO: 91 FL (ref 82–98)
MONOCYTES # BLD AUTO: 0.38 THOUSAND/ΜL (ref 0.17–1.22)
MONOCYTES NFR BLD AUTO: 6 % (ref 4–12)
NEUTROPHILS # BLD AUTO: 4.15 THOUSANDS/ΜL (ref 1.85–7.62)
NEUTS SEG NFR BLD AUTO: 65 % (ref 43–75)
NRBC BLD AUTO-RTO: 0 /100 WBCS
PLATELET # BLD AUTO: 244 THOUSANDS/UL (ref 149–390)
PMV BLD AUTO: 10.3 FL (ref 8.9–12.7)
POTASSIUM SERPL-SCNC: 3.8 MMOL/L (ref 3.5–5.3)
PROT SERPL-MCNC: 7.4 G/DL (ref 6.4–8.2)
RBC # BLD AUTO: 4.34 MILLION/UL (ref 3.81–5.12)
SODIUM SERPL-SCNC: 140 MMOL/L (ref 136–145)
TRIGL SERPL-MCNC: 74 MG/DL
TSH SERPL DL<=0.05 MIU/L-ACNC: 2.17 UIU/ML (ref 0.36–3.74)
WBC # BLD AUTO: 6.32 THOUSAND/UL (ref 4.31–10.16)

## 2020-11-04 PROCEDURE — 84443 ASSAY THYROID STIM HORMONE: CPT

## 2020-11-04 PROCEDURE — 86255 FLUORESCENT ANTIBODY SCREEN: CPT

## 2020-11-04 PROCEDURE — 80053 COMPREHEN METABOLIC PANEL: CPT

## 2020-11-04 PROCEDURE — 80061 LIPID PANEL: CPT

## 2020-11-04 PROCEDURE — 36415 COLL VENOUS BLD VENIPUNCTURE: CPT

## 2020-11-04 PROCEDURE — 85025 COMPLETE CBC W/AUTO DIFF WBC: CPT

## 2020-11-04 PROCEDURE — 82306 VITAMIN D 25 HYDROXY: CPT

## 2020-11-05 PROBLEM — Z28.21 IMMUNIZATION REFUSED: Status: ACTIVE | Noted: 2020-11-03

## 2020-11-05 PROBLEM — Z28.21 IMMUNIZATION REFUSED: Status: ACTIVE | Noted: 2020-11-05

## 2020-11-05 PROBLEM — E55.9 VITAMIN D DEFICIENCY: Status: ACTIVE | Noted: 2020-11-03

## 2020-11-05 PROBLEM — E55.9 VITAMIN D DEFICIENCY: Status: ACTIVE | Noted: 2020-11-05

## 2020-11-05 LAB — ENDOMYSIUM IGA SER QL: NEGATIVE

## 2020-11-09 ENCOUNTER — TELEPHONE (OUTPATIENT)
Dept: FAMILY MEDICINE CLINIC | Facility: CLINIC | Age: 30
End: 2020-11-09

## 2020-11-09 DIAGNOSIS — K58.0 IRRITABLE BOWEL SYNDROME WITH DIARRHEA: ICD-10-CM

## 2020-11-09 RX ORDER — DICYCLOMINE HYDROCHLORIDE 10 MG/1
10 CAPSULE ORAL
Qty: 180 CAPSULE | Refills: 0 | Status: SHIPPED | OUTPATIENT
Start: 2020-11-09 | End: 2021-03-22 | Stop reason: ALTCHOICE

## 2020-11-12 ENCOUNTER — TELEMEDICINE (OUTPATIENT)
Dept: FAMILY MEDICINE CLINIC | Facility: CLINIC | Age: 30
End: 2020-11-12
Payer: COMMERCIAL

## 2020-11-12 VITALS — TEMPERATURE: 98.1 F

## 2020-11-12 DIAGNOSIS — J02.9 SORE THROAT: Primary | ICD-10-CM

## 2020-11-12 DIAGNOSIS — Z20.822 EXPOSURE TO COVID-19 VIRUS: ICD-10-CM

## 2020-11-12 PROCEDURE — 1036F TOBACCO NON-USER: CPT | Performed by: FAMILY MEDICINE

## 2020-11-12 PROCEDURE — 99213 OFFICE O/P EST LOW 20 MIN: CPT | Performed by: FAMILY MEDICINE

## 2020-11-12 PROCEDURE — U0003 INFECTIOUS AGENT DETECTION BY NUCLEIC ACID (DNA OR RNA); SEVERE ACUTE RESPIRATORY SYNDROME CORONAVIRUS 2 (SARS-COV-2) (CORONAVIRUS DISEASE [COVID-19]), AMPLIFIED PROBE TECHNIQUE, MAKING USE OF HIGH THROUGHPUT TECHNOLOGIES AS DESCRIBED BY CMS-2020-01-R: HCPCS | Performed by: FAMILY MEDICINE

## 2020-11-14 LAB — SARS-COV-2 RNA SPEC QL NAA+PROBE: NOT DETECTED

## 2020-11-16 ENCOUNTER — TELEPHONE (OUTPATIENT)
Dept: FAMILY MEDICINE CLINIC | Facility: CLINIC | Age: 30
End: 2020-11-16

## 2020-12-18 DIAGNOSIS — E55.9 VITAMIN D DEFICIENCY: ICD-10-CM

## 2020-12-19 RX ORDER — CALCIUM CARBONATE/VITAMIN D3 600 MG-20
TABLET ORAL
Qty: 30 CAPSULE | Refills: 2 | Status: SHIPPED | OUTPATIENT
Start: 2020-12-19 | End: 2021-03-17

## 2021-01-12 ENCOUNTER — LAB (OUTPATIENT)
Dept: LAB | Facility: HOSPITAL | Age: 31
End: 2021-01-12
Payer: COMMERCIAL

## 2021-01-12 ENCOUNTER — HOSPITAL ENCOUNTER (OUTPATIENT)
Dept: ULTRASOUND IMAGING | Facility: HOSPITAL | Age: 31
Discharge: HOME | End: 2021-01-12
Payer: COMMERCIAL

## 2021-01-12 ENCOUNTER — OFFICE VISIT (OUTPATIENT)
Dept: OBGYN CLINIC | Facility: CLINIC | Age: 31
End: 2021-01-12
Payer: COMMERCIAL

## 2021-01-12 VITALS
WEIGHT: 130.6 LBS | HEIGHT: 64 IN | BODY MASS INDEX: 22.3 KG/M2 | DIASTOLIC BLOOD PRESSURE: 62 MMHG | SYSTOLIC BLOOD PRESSURE: 110 MMHG

## 2021-01-12 DIAGNOSIS — N92.6 IRREGULAR MENSES: Primary | ICD-10-CM

## 2021-01-12 DIAGNOSIS — N92.6 IRREGULAR MENSES: ICD-10-CM

## 2021-01-12 DIAGNOSIS — Z71.85 HPV VACCINE COUNSELING: ICD-10-CM

## 2021-01-12 PROBLEM — L72.9 SCALP CYST: Status: RESOLVED | Noted: 2019-11-22 | Resolved: 2021-01-12

## 2021-01-12 LAB
ESTRADIOL SERPL-MCNC: 42 PG/ML
FSH SERPL-ACNC: 4.9 MIU/ML
LH SERPL-ACNC: 17.4 MIU/ML
PROLACTIN SERPL-MCNC: 8.7 NG/ML
TSH SERPL DL<=0.05 MIU/L-ACNC: 0.87 UIU/ML (ref 0.47–4.68)

## 2021-01-12 PROCEDURE — 90471 IMMUNIZATION ADMIN: CPT

## 2021-01-12 PROCEDURE — 84443 ASSAY THYROID STIM HORMONE: CPT

## 2021-01-12 PROCEDURE — 76856 US EXAM PELVIC COMPLETE: CPT

## 2021-01-12 PROCEDURE — 83001 ASSAY OF GONADOTROPIN (FSH): CPT

## 2021-01-12 PROCEDURE — 36415 COLL VENOUS BLD VENIPUNCTURE: CPT

## 2021-01-12 PROCEDURE — 90651 9VHPV VACCINE 2/3 DOSE IM: CPT

## 2021-01-12 PROCEDURE — 84146 ASSAY OF PROLACTIN: CPT

## 2021-01-12 PROCEDURE — 3008F BODY MASS INDEX DOCD: CPT | Performed by: OBSTETRICS & GYNECOLOGY

## 2021-01-12 PROCEDURE — 82670 ASSAY OF TOTAL ESTRADIOL: CPT

## 2021-01-12 PROCEDURE — 1036F TOBACCO NON-USER: CPT | Performed by: OBSTETRICS & GYNECOLOGY

## 2021-01-12 PROCEDURE — 83002 ASSAY OF GONADOTROPIN (LH): CPT

## 2021-01-12 PROCEDURE — 99203 OFFICE O/P NEW LOW 30 MIN: CPT | Performed by: OBSTETRICS & GYNECOLOGY

## 2021-01-12 NOTE — PROGRESS NOTES
Patient is a 27 y o  No obstetric history on file  with Patient's last menstrual period was 01/02/2021  who presents requesting evaluation of irregular menses  She reports this has been present for the last 1-2 years  She reports the last few months have been better  She reports she achieved menarche age 16-14  She reports she used to have painful menses but it was monthly  She reports she has had to leave work several times due to the pain  She reports that she had to used maxipads every 2-3 hours for the first two days and then every 4-5 hours  Her menses last 5 days  She reports she typically uses Pamprin for her pain  She reports in the last 1-2 years she has had increased anxiety and stress and symptoms of depression  She reports her menses have been ranging from 20-40 days and would still last 5 days  She reports that for the last several months her pain has improved  She reports for the last 2-3 months they cycles are regular again and still last 5 days and the flow is similar but she no longer needs pamprin  She just wants to make sure everything is ok  She reports that she has been taking herbal medications (3) for emotional strength and for claustrophobia,but she does not know what they are called  She reports that since taking the herbs her menses have been irregular but her pain has improved  Pt advised without knowing what the herbs are I don't know if they are causing menstrual irregularities  Pt had TSH in June and her TSH was 0 965  She started her herbs in August and TSH in November was 2 170  Will recheck in addition to PRL, E2, [facility],     Upon questioning, pt has never had HPV vaccination and has never been sexually active  Reviewed risks and benefits and pt desires to proceed  First dose given        Past Medical History:   Diagnosis Date    Chickenpox     HPV vaccine counseling     never had    Panic attack 6/17/2019    Positive depression screening 2/5/2019    Scalp cyst 2019    removed       Past Surgical History:   Procedure Laterality Date    LASIK Bilateral 2019    WISDOM TOOTH EXTRACTION         OB History    Para Term  AB Living   0 0 0 0 0 0   SAB TAB Ectopic Multiple Live Births   0 0 0 0 0   Obstetric Comments   Menarche; [age]           Current Outpatient Medications:     ALPRAZolam (XANAX) 0 25 mg tablet, Take 1 tablet (0 25 mg total) by mouth daily at bedtime as needed for anxiety, Disp: 10 tablet, Rfl: 0    CVS D3 50 MCG (2000 UT) CAPS, TAKE 1 CAPSULE BY MOUTH EVERY DAY, Disp: 30 capsule, Rfl: 2    fluticasone (FLONASE) 50 mcg/act nasal spray, 2 sprays into each nostril daily, Disp: 16 g, Rfl: 2    loratadine (Claritin) 10 mg tablet, Take 1 tablet (10 mg total) by mouth daily, Disp: 30 tablet, Rfl: 2    CASCARA SAGRADA PO, Take 1 tablet by mouth, Disp: , Rfl:     dicyclomine (BENTYL) 10 mg capsule, Take 1 capsule (10 mg total) by mouth 2 (two) times a day (Patient not taking: Reported on 2021), Disp: 180 capsule, Rfl: 0    No Known Allergies    Social History     Socioeconomic History    Marital status: Single     Spouse name: None    Number of children: 0    Years of education: None    Highest education level: Master's degree (e g , MA, MS, Hugh, MEd, MSW, CRAIG)   Occupational History    Occupation: teacher     Comment: [employer]   Social Needs    Financial resource strain: None    Food insecurity     Worry: Never true     Inability: Never true    Transportation needs     Medical: No     Non-medical: No   Tobacco Use    Smoking status: Never Smoker    Smokeless tobacco: Never Used   Substance and Sexual Activity    Alcohol use:  Yes     Alcohol/week: 0 0 - 1 0 standard drinks     Frequency: Monthly or less     Drinks per session: 1 or 2     Comment: holidays    Drug use: No    Sexual activity: Never   Lifestyle    Physical activity     Days per week: 1 day     Minutes per session: 30 min    Stress: Not at all Relationships    Social connections     Talks on phone: More than three times a week     Gets together: More than three times a week     Attends Restorationism service: Never     Active member of club or organization: No     Attends meetings of clubs or organizations: Never     Relationship status: Never     Intimate partner violence     Fear of current or ex partner: No     Emotionally abused: No     Physically abused: No     Forced sexual activity: No   Other Topics Concern    None   Social History Narrative    Nondenominational: French Jainism    Accepts blood products       Family History   Problem Relation Age of Onset    Stroke Father     Sarcoidosis Mother     No Known Problems Sister     Diabetes Maternal Grandmother     Hypertension Maternal Grandmother     Diabetes Maternal Grandfather     Hypertension Maternal Grandfather     Stomach cancer Paternal Grandmother     Breast cancer Neg Hx     Ovarian cancer Neg Hx     Colon cancer Neg Hx        Review of Systems   Constitutional: Negative for chills, fatigue, fever and unexpected weight change  HENT: Negative for congestion, mouth sores and sore throat  Respiratory: Negative for cough, chest tightness, shortness of breath and wheezing  Cardiovascular: Negative for chest pain and palpitations  Gastrointestinal: Positive for constipation (IBS) and diarrhea (IBS)  Negative for abdominal distention, abdominal pain, nausea and vomiting  Endocrine: Negative for cold intolerance and heat intolerance  Genitourinary: Positive for menstrual problem  Negative for dyspareunia, dysuria, genital sores, pelvic pain, vaginal bleeding, vaginal discharge and vaginal pain  Musculoskeletal: Negative for arthralgias  Skin: Negative for color change and rash  Neurological: Negative for dizziness, light-headedness and headaches  Hematological: Negative for adenopathy         Blood pressure 110/62, height 5' 3 5" (1 613 m), weight 59 2 kg (130 lb 9 6 mario), last menstrual period 01/02/2021, not currently breastfeeding  and Body mass index is 22 77 kg/m²  Physical Exam  Constitutional:       Appearance: Normal appearance  She is well-developed and normal weight  HENT:      Head: Normocephalic and atraumatic  Eyes:      Extraocular Movements: Extraocular movements intact  Conjunctiva/sclera: Conjunctivae normal    Neck:      Musculoskeletal: Normal range of motion and neck supple  Thyroid: No thyromegaly  Trachea: No tracheal deviation  Cardiovascular:      Rate and Rhythm: Normal rate and regular rhythm  Heart sounds: Normal heart sounds  Pulmonary:      Effort: Pulmonary effort is normal  No respiratory distress  Breath sounds: Normal breath sounds  No stridor  No wheezing or rales  Abdominal:      General: Bowel sounds are normal  There is no distension  Palpations: Abdomen is soft  There is no mass  Tenderness: There is no abdominal tenderness  There is no guarding or rebound  Hernia: No hernia is present  Musculoskeletal: Normal range of motion  General: No tenderness  Lymphadenopathy:      Cervical: No cervical adenopathy  Skin:     General: Skin is warm  Findings: No erythema or rash  Neurological:      Mental Status: She is alert and oriented to person, place, and time  Psychiatric:         Mood and Affect: Mood normal          Behavior: Behavior normal          Thought Content: Thought content normal          Judgment: Judgment normal          Pt declines pelvic examination as she has never been sexually active  A/P:  Pt is a 27 y o  No obstetric history on file  with      Diagnoses and all orders for this visit:    Irregular menses  -     Estradiol; Future  -     Follicle stimulating hormone; Future  -     Luteinizing hormone; Future  -     Prolactin; Future  -     TSH, 3rd generation with Free T4 reflex; Future  -     US pelvis transabdominal only;  Future    HPV vaccine counseling  -     HPV VACCINE 9 VALENT IM      Pt will send us what herbs she is taking so we can see if this is affecting her menses

## 2021-01-14 ENCOUNTER — TELEPHONE (OUTPATIENT)
Dept: OBGYN CLINIC | Facility: CLINIC | Age: 31
End: 2021-01-14

## 2021-01-14 NOTE — TELEPHONE ENCOUNTER
Patient LM on triage line for ultrasound and blood work results  I LMOM that ultrasound results are not in patients chart as she had her ultrasound 1/12/21  Patient aware Dr Bisi Yu will send her a my chart message or give her a call when results are in chart

## 2021-02-18 ENCOUNTER — TELEPHONE (OUTPATIENT)
Dept: FAMILY MEDICINE CLINIC | Facility: CLINIC | Age: 31
End: 2021-02-18

## 2021-02-18 NOTE — TELEPHONE ENCOUNTER
Patient called regarding alprazolam  States she only uses medication when traveling  Wants to know what is the max amount she is able to take in a day?

## 2021-02-24 NOTE — TELEPHONE ENCOUNTER
Patient notified  States she only wants to use this medication when she travels but taking the alprazolam once does not help her when she travels  Wants to know how many pills she can take only when she travels?

## 2021-02-24 NOTE — TELEPHONE ENCOUNTER
patiernt on Xanax 0 25 mg po ,she can take one tablets twice or tid the day before travel if need it

## 2021-03-15 ENCOUNTER — CLINICAL SUPPORT (OUTPATIENT)
Dept: OBGYN CLINIC | Facility: CLINIC | Age: 31
End: 2021-03-15
Payer: COMMERCIAL

## 2021-03-15 VITALS — HEIGHT: 64 IN | WEIGHT: 133.2 LBS | BODY MASS INDEX: 22.74 KG/M2

## 2021-03-15 DIAGNOSIS — Z23 NEED FOR HPV VACCINATION: Primary | ICD-10-CM

## 2021-03-15 PROCEDURE — 3008F BODY MASS INDEX DOCD: CPT | Performed by: NURSE PRACTITIONER

## 2021-03-15 PROCEDURE — 90471 IMMUNIZATION ADMIN: CPT | Performed by: OBSTETRICS & GYNECOLOGY

## 2021-03-15 PROCEDURE — 90651 9VHPV VACCINE 2/3 DOSE IM: CPT | Performed by: OBSTETRICS & GYNECOLOGY

## 2021-03-15 PROCEDURE — 96372 THER/PROPH/DIAG INJ SC/IM: CPT | Performed by: OBSTETRICS & GYNECOLOGY

## 2021-03-15 NOTE — PROGRESS NOTES
Pt here in office today for her 2nd benji vaccine  Pt tolerated injection in left upper arm without any complications, has her 3rd injection scheduled      Chana Chiang 47: [id number]  LOT: [id number]  EXP: 06/21/2022

## 2021-03-17 DIAGNOSIS — E55.9 VITAMIN D DEFICIENCY: ICD-10-CM

## 2021-03-17 RX ORDER — CALCIUM CARBONATE/VITAMIN D3 600 MG-20
TABLET ORAL
Qty: 90 CAPSULE | Refills: 0 | Status: SHIPPED | OUTPATIENT
Start: 2021-03-17 | End: 2021-06-19

## 2021-03-22 ENCOUNTER — TELEMEDICINE (OUTPATIENT)
Dept: FAMILY MEDICINE CLINIC | Facility: CLINIC | Age: 31
End: 2021-03-22
Payer: COMMERCIAL

## 2021-03-22 ENCOUNTER — TELEPHONE (OUTPATIENT)
Dept: FAMILY MEDICINE CLINIC | Facility: CLINIC | Age: 31
End: 2021-03-22

## 2021-03-22 DIAGNOSIS — Z20.822 EXPOSURE TO COVID-19 VIRUS: ICD-10-CM

## 2021-03-22 DIAGNOSIS — R51.9 NONINTRACTABLE HEADACHE, UNSPECIFIED CHRONICITY PATTERN, UNSPECIFIED HEADACHE TYPE: ICD-10-CM

## 2021-03-22 DIAGNOSIS — Z20.822 CONTACT WITH AND (SUSPECTED) EXPOSURE TO COVID-19: Primary | ICD-10-CM

## 2021-03-22 DIAGNOSIS — R51.9 NONINTRACTABLE HEADACHE, UNSPECIFIED CHRONICITY PATTERN, UNSPECIFIED HEADACHE TYPE: Primary | ICD-10-CM

## 2021-03-22 PROCEDURE — 3725F SCREEN DEPRESSION PERFORMED: CPT | Performed by: NURSE PRACTITIONER

## 2021-03-22 PROCEDURE — U0005 INFEC AGEN DETEC AMPLI PROBE: HCPCS | Performed by: NURSE PRACTITIONER

## 2021-03-22 PROCEDURE — U0003 INFECTIOUS AGENT DETECTION BY NUCLEIC ACID (DNA OR RNA); SEVERE ACUTE RESPIRATORY SYNDROME CORONAVIRUS 2 (SARS-COV-2) (CORONAVIRUS DISEASE [COVID-19]), AMPLIFIED PROBE TECHNIQUE, MAKING USE OF HIGH THROUGHPUT TECHNOLOGIES AS DESCRIBED BY CMS-2020-01-R: HCPCS | Performed by: NURSE PRACTITIONER

## 2021-03-22 PROCEDURE — 99213 OFFICE O/P EST LOW 20 MIN: CPT | Performed by: NURSE PRACTITIONER

## 2021-03-22 NOTE — PROGRESS NOTES
COVID-19 Virtual Visit     Assessment/Plan:    Problem List Items Addressed This Visit        Other    Exposure to COVID-19 virus     Acute symptomatic patient started with body aches, chills, headache, fatigue, diarrhea, nausea, and sore throat 3/19/2021  Patient had known covid exposure to sister and YAZAN who tested positive  Last contact was 3/18/2021  Recommend report for covid testing, increase fluids, vit c, vit d, zinc, and otc tylenol prn  Patient to quarantine accordingly until test results return and further instructions given  Patient to call with worsening of symptoms such as shortness of breath, chest pain, or increasing temperature  Patient verbalized understanding and agrees with treatment plan  [address]    Your healthcare provider and/or public health staff have evaluated you and have determined that you do not need to remain in the hospital at this time  At this time you can be isolated at home where you will be monitored by staff from your local or state health department  You should carefully follow the prevention and isolation steps below until a healthcare provider or local or state health department says that you can return to your normal activities  Stay home except to get medical care    People who are mildly ill with COVID-19 are able to isolate at home during their illness  You should restrict activities outside your home, except for getting medical care  Do not go to work, school, or public areas  Avoid using public transportation, ride-sharing, or taxis  Separate yourself from other people and animals in your home    People: As much as possible, you should stay in a specific room and away from other people in your home  Also, you should use a separate bathroom, if available  Animals: You should restrict contact with pets and other animals while you are sick with COVID-19, just like you would around other people   Although there have not been reports of pets or other animals becoming sick with COVID-19, it is still recommended that people sick with COVID-19 limit contact with animals until more information is known about the virus  When possible, have another member of your household care for your animals while you are sick  If you are sick with COVID-19, avoid contact with your pet, including petting, snuggling, being kissed or licked, and sharing food  If you must care for your pet or be around animals while you are sick, wash your hands before and after you interact with pets and wear a facemask  See COVID-19 and Animals for more information  Call ahead before visiting your doctor    If you have a medical appointment, call the healthcare provider and tell them that you have or may have COVID-19  This will help the healthcare providers office take steps to keep other people from getting infected or exposed  Wear a facemask    You should wear a facemask when you are around other people (e g , sharing a room or vehicle) or pets and before you enter a healthcare providers office  If you are not able to wear a facemask (for example, because it causes trouble breathing), then people who live with you should not stay in the same room with you, or they should wear a facemask if they enter your room  Cover your coughs and sneezes    Cover your mouth and nose with a tissue when you cough or sneeze  Throw used tissues in a lined trash can  Immediately wash your hands with soap and water for at least 20 seconds or, if soap and water are not available, clean your hands with an alcohol-based hand  that contains at least 60% alcohol  Clean your hands often    Wash your hands often with soap and water for at least 20 seconds, especially after blowing your nose, coughing, or sneezing; going to the bathroom; and before eating or preparing food   If soap and water are not readily available, use an alcohol-based hand  with at least 60% alcohol, covering all surfaces of your hands and rubbing them together until they feel dry  Soap and water are the best option if hands are visibly dirty  Avoid touching your eyes, nose, and mouth with unwashed hands  Avoid sharing personal household items    You should not share dishes, drinking glasses, cups, eating utensils, towels, or bedding with other people or pets in your home  After using these items, they should be washed thoroughly with soap and water  Clean all high-touch surfaces everyday    High touch surfaces include counters, tabletops, doorknobs, bathroom fixtures, toilets, phones, keyboards, tablets, and bedside tables  Also, clean any surfaces that may have blood, stool, or body fluids on them  Use a household cleaning spray or wipe, according to the label instructions  Labels contain instructions for safe and effective use of the cleaning product including precautions you should take when applying the product, such as wearing gloves and making sure you have good ventilation during use of the product  Monitor your symptoms    Seek prompt medical attention if your illness is worsening (e g , difficulty breathing)  Before seeking care, call your healthcare provider and tell them that you have, or are being evaluated for, COVID-19  Put on a facemask before you enter the facility  These steps will help the healthcare providers office to keep other people in the office or waiting room from getting infected or exposed  Ask your healthcare provider to call the local or [facility] health department  Persons who are placed under active monitoring or facilitated self-monitoring should follow instructions provided by their local health department or occupational health professionals, as appropriate  If you have a medical emergency and need to call 911, notify the dispatch personnel that you have, or are being evaluated for COVID-19   If possible, put on a facemask before emergency medical services arrive  Discontinuing home isolation    Patients with confirmed COVID-19 should remain under home isolation precautions until the following conditions are met:   - They have had no fever for at least 24 hours (that is one full day of no fever without the use medicine that reduces fevers)  AND  - other symptoms have improved (for example, when their cough or shortness of breath have improved)  AND  - If had mild or moderate illness, at least 10 days have passed since their symptoms first appeared or if severe illness (needed oxygen) or immunosuppressed, at least 20 days have passed since symptoms first appeared  Patients with confirmed COVID-19 should also notify close contacts (including their workplace) and ask that they self-quarantine  Currently, close contact is defined as being within 6 feet for 15 minutes or more from the period 24 hours starting 48 hours before symptom onset to the time at which the patient went into isolation  Close contacts of patients diagnosed with COVID-19 should be instructed by the patient to self-quarantine for 14 days from the last time of their last contact with the patient  Source: [employer]           Relevant Orders    Novel Coronavirus (Covid-19),PCR [facility] - Collected at [place] [address] or [facility]    Nonintractable headache - Primary     Acute symptomatic patient with headache starting 3/19/2021  Patient with known covid exposure  Recommend test for covid and otc tylenol prn  Relevant Orders    Novel Coronavirus (Covid-19),PCR [facility] - Collected at [facility] or [facility]         Disposition:     I referred patient to one of our centralized sites for a COVID-19 swab  I have spent 15 minutes directly with the patient  Greater than 50% of this time was spent in counseling/coordination of care regarding: instructions for management and patient and family education          Encounter provider [facility]    Provider located at [address]  [address] [facility] [address] [address] [phone]    Recent Visits  No visits were found meeting these conditions  Showing recent visits within past 7 days and meeting all other requirements     Today's Visits  Date Type Provider Dept   03/22/21 Telemedicine Venkatesh Garcias, [address]   03/22/21 Telephone [address]   Showing today's visits and meeting all other requirements     Future Appointments  No visits were found meeting these conditions  Showing future appointments within next 150 days and meeting all other requirements      This virtual check-in was done via [place] [employer] and patient was informed that this is not a secure, HIPAA-compliant platform  She agrees to proceed  Patient agrees to participate in a virtual check in via telephone or video visit instead of presenting to the office to address urgent/immediate medical needs  Patient is aware this is a billable service  After connecting through [facility], the patient was identified by name and date of birth  Alex Pace was informed that this was a telemedicine visit and that the exam was being conducted confidentially over secure lines  My office door was closed  No one else was in the room  Alex Pace acknowledged consent and understanding of privacy and security of the telemedicine visit  I informed the patient that I have reviewed her record in Epic and presented the opportunity for her to ask any questions regarding the visit today  The patient agreed to participate  Subjective:   Alex Pace is a 32 y o  female who is concerned about COVID-19  Patient's symptoms include chills, fatigue, sore throat, nausea, diarrhea, myalgias and headache   Patient denies fever, malaise, congestion, rhinorrhea, anosmia, loss of taste, cough, shortness of breath, chest tightness, abdominal pain and vomiting  Date of symptom onset: 3/19/2021  Date of exposure: 3/18/2021    Exposure:   Contact with a person who is under investigation (PUI) for or who is positive for COVID-19 within the last 14 days?: Yes    Hospitalized recently for fever and/or lower respiratory symptoms?: No      Currently a healthcare worker that is involved in direct patient care?: No      Works in a special setting where the risk of COVID-19 transmission may be high? (this may include long-term care, correctional and skilled nursing facilities; homeless shelters; assisted-living facilities and group homes ): No      Resident in a special setting where the risk of COVID-19 transmission may be high? (this may include long-term care, correctional and skilled nursing facilities; homeless shelters; assisted-living facilities and group homes ): No      Lab Results   Component Value Date    [facility] 98 Not Detected 11/12/2020     Past Medical History:   Diagnosis Date    Chickenpox     HPV vaccine counseling     never had    Panic attack 6/17/2019    Positive depression screening 2/5/2019    Scalp cyst 11/22/2019    removed     Past Surgical History:   Procedure Laterality Date    LASIK Bilateral 01/19/2019    WISDOM TOOTH EXTRACTION       Current Outpatient Medications   Medication Sig Dispense Refill    ALPRAZolam (XANAX) 0 25 mg tablet Take 1 tablet (0 25 mg total) by mouth daily at bedtime as needed for anxiety 10 tablet 0    CVS D3 50 MCG (2000 UT) CAPS TAKE 1 CAPSULE BY MOUTH EVERY DAY 90 capsule 0    fluticasone (FLONASE) 50 mcg/act nasal spray 2 sprays into each nostril daily 16 g 2    loratadine (Claritin) 10 mg tablet Take 1 tablet (10 mg total) by mouth daily 30 tablet 2     No current facility-administered medications for this visit  No Known Allergies    Review of Systems   Constitutional: Positive for chills and fatigue  Negative for activity change, appetite change and fever     HENT: Positive for sore throat  Negative for congestion, postnasal drip, rhinorrhea and sneezing  Respiratory: Negative for cough, chest tightness, shortness of breath and wheezing  Cardiovascular: Negative for chest pain and palpitations  Gastrointestinal: Positive for diarrhea and nausea  Negative for abdominal pain, constipation and vomiting  Genitourinary: Negative for dysuria  Musculoskeletal: Positive for myalgias  Negative for arthralgias  Skin: Negative for color change, pallor and rash  Neurological: Positive for headaches  Negative for dizziness, weakness and light-headedness  Hematological: Negative for adenopathy  Psychiatric/Behavioral: Negative for agitation and confusion  Objective:    Vitals:       Physical Exam  Vitals signs and nursing note reviewed  Constitutional:       General: She is not in acute distress  Appearance: Normal appearance  She is ill-appearing  She is not toxic-appearing or diaphoretic  HENT:      Head: Normocephalic and atraumatic  Nose: No rhinorrhea  Eyes:      General: No scleral icterus  Right eye: No discharge  Left eye: No discharge  Neck:      Musculoskeletal: Normal range of motion  Pulmonary:      Effort: Pulmonary effort is normal  No respiratory distress  Musculoskeletal: Normal range of motion  Skin:     Coloration: Skin is not jaundiced or pale  Findings: No bruising, erythema, lesion or rash  Neurological:      Mental Status: She is alert and oriented to person, place, and time  Psychiatric:         Mood and Affect: Mood normal          Behavior: Behavior normal          Thought Content: Thought content normal          Judgment: Judgment normal        VIRTUAL VISIT DISCLAIMER    Jim Hill acknowledges that she has consented to an online visit or consultation   She understands that the online visit is based solely on information provided by her, and that, in the absence of a face-to-face physical evaluation by the physician, the diagnosis she receives is both limited and provisional in terms of accuracy and completeness  This is not intended to replace a full medical face-to-face evaluation by the physician  Alpesh Blank understands and accepts these terms

## 2021-03-22 NOTE — ASSESSMENT & PLAN NOTE
Acute symptomatic patient started with body aches, chills, headache, fatigue, diarrhea, nausea, and sore throat 3/19/2021  Patient had known covid exposure to sister and YAZAN who tested positive  Last contact was 3/18/2021  Recommend report for covid testing, increase fluids, vit c, vit d, zinc, and otc tylenol prn  Patient to quarantine accordingly until test results return and further instructions given  Patient to call with worsening of symptoms such as shortness of breath, chest pain, or increasing temperature  Patient verbalized understanding and agrees with treatment plan  [address]    Your healthcare provider and/or public health staff have evaluated you and have determined that you do not need to remain in the hospital at this time  At this time you can be isolated at home where you will be monitored by staff from your local or state health department  You should carefully follow the prevention and isolation steps below until a healthcare provider or local or state health department says that you can return to your normal activities  Stay home except to get medical care    People who are mildly ill with COVID-19 are able to isolate at home during their illness  You should restrict activities outside your home, except for getting medical care  Do not go to work, school, or public areas  Avoid using public transportation, ride-sharing, or taxis  Separate yourself from other people and animals in your home    People: As much as possible, you should stay in a specific room and away from other people in your home  Also, you should use a separate bathroom, if available  Animals: You should restrict contact with pets and other animals while you are sick with COVID-19, just like you would around other people   Although there have not been reports of pets or other animals becoming sick with COVID-19, it is still recommended that people sick with COVID-19 limit contact with animals until more information is known about the virus  When possible, have another member of your household care for your animals while you are sick  If you are sick with COVID-19, avoid contact with your pet, including petting, snuggling, being kissed or licked, and sharing food  If you must care for your pet or be around animals while you are sick, wash your hands before and after you interact with pets and wear a facemask  See COVID-19 and Animals for more information  Call ahead before visiting your doctor    If you have a medical appointment, call the healthcare provider and tell them that you have or may have COVID-19  This will help the healthcare providers office take steps to keep other people from getting infected or exposed  Wear a facemask    You should wear a facemask when you are around other people (e g , sharing a room or vehicle) or pets and before you enter a healthcare providers office  If you are not able to wear a facemask (for example, because it causes trouble breathing), then people who live with you should not stay in the same room with you, or they should wear a facemask if they enter your room  Cover your coughs and sneezes    Cover your mouth and nose with a tissue when you cough or sneeze  Throw used tissues in a lined trash can  Immediately wash your hands with soap and water for at least 20 seconds or, if soap and water are not available, clean your hands with an alcohol-based hand  that contains at least 60% alcohol  Clean your hands often    Wash your hands often with soap and water for at least 20 seconds, especially after blowing your nose, coughing, or sneezing; going to the bathroom; and before eating or preparing food  If soap and water are not readily available, use an alcohol-based hand  with at least 60% alcohol, covering all surfaces of your hands and rubbing them together until they feel dry    Soap and water are the best option if hands are visibly dirty  Avoid touching your eyes, nose, and mouth with unwashed hands  Avoid sharing personal household items    You should not share dishes, drinking glasses, cups, eating utensils, towels, or bedding with other people or pets in your home  After using these items, they should be washed thoroughly with soap and water  Clean all high-touch surfaces everyday    High touch surfaces include counters, tabletops, doorknobs, bathroom fixtures, toilets, phones, keyboards, tablets, and bedside tables  Also, clean any surfaces that may have blood, stool, or body fluids on them  Use a household cleaning spray or wipe, according to the label instructions  Labels contain instructions for safe and effective use of the cleaning product including precautions you should take when applying the product, such as wearing gloves and making sure you have good ventilation during use of the product  Monitor your symptoms    Seek prompt medical attention if your illness is worsening (e g , difficulty breathing)  Before seeking care, call your healthcare provider and tell them that you have, or are being evaluated for, COVID-19  Put on a facemask before you enter the facility  These steps will help the healthcare providers office to keep other people in the office or waiting room from getting infected or exposed  Ask your healthcare provider to call the local or state health department  Persons who are placed under active monitoring or facilitated self-monitoring should follow instructions provided by their local health department or occupational health professionals, as appropriate  If you have a medical emergency and need to call 911, notify the dispatch personnel that you have, or are being evaluated for COVID-19  If possible, put on a facemask before emergency medical services arrive      Discontinuing home isolation    Patients with confirmed COVID-19 should remain under home isolation precautions until the following conditions are met:   - They have had no fever for at least 24 hours (that is one full day of no fever without the use medicine that reduces fevers)  AND  - other symptoms have improved (for example, when their cough or shortness of breath have improved)  AND  - If had mild or moderate illness, at least 10 days have passed since their symptoms first appeared or if severe illness (needed oxygen) or immunosuppressed, at least 20 days have passed since symptoms first appeared  Patients with confirmed COVID-19 should also notify close contacts (including their workplace) and ask that they self-quarantine  Currently, close contact is defined as being within 6 feet for 15 minutes or more from the period 24 hours starting 48 hours before symptom onset to the time at which the patient went into isolation  Close contacts of patients diagnosed with COVID-19 should be instructed by the patient to self-quarantine for 14 days from the last time of their last contact with the patient       Source: [employer]

## 2021-03-22 NOTE — ASSESSMENT & PLAN NOTE
Acute symptomatic patient with headache starting 3/19/2021  Patient with known covid exposure  Recommend test for covid and otc tylenol prn

## 2021-03-23 ENCOUNTER — TELEMEDICINE (OUTPATIENT)
Dept: FAMILY MEDICINE CLINIC | Facility: CLINIC | Age: 31
End: 2021-03-23
Payer: COMMERCIAL

## 2021-03-23 DIAGNOSIS — U07.1 COVID-19 VIRUS INFECTION: Primary | ICD-10-CM

## 2021-03-23 LAB — SARS-COV-2 RNA RESP QL NAA+PROBE: POSITIVE

## 2021-03-23 PROCEDURE — 1036F TOBACCO NON-USER: CPT | Performed by: NURSE PRACTITIONER

## 2021-03-23 PROCEDURE — 99213 OFFICE O/P EST LOW 20 MIN: CPT | Performed by: NURSE PRACTITIONER

## 2021-03-23 NOTE — PATIENT INSTRUCTIONS
COVID-19 Convalescent Plasma Donation     Once you have fully recovered from COVID-19, you may be able to help patients currently fighting the infection by donating plasma  This is the same process as donating blood  As you recover from the infection, your body is making antibodies targeting COVID-19  When a person becomes ill with COVID-19, it can take some time to develop the antibodies needed to combat the disease  In patients who become seriously ill, they may not make antibodies quickly enough to fight the disease  By collecting plasma from recovered patients and giving it to seriously ill patients, we hope we can provide a boost to the sick patients' immune systems and help stimulate recovery  This is particularly important in patients who may already have a suppressed immune system  In order to be a donor, your symptoms must be completely resolved for 14 - 28 days  To learn more about convalescent plasma donation, please visit [facility]'s website at [email or web address] [email or web address]/    If you are willing to be a donor, please contact Trinidad Shirley at [address] ([phone]) to schedule a brief telephone call  A member of our team will also reach out to you in a few weeks to answer any questions you may have related to convalescent plasma donation

## 2021-03-23 NOTE — PROGRESS NOTES
COVID-19 Virtual Visit     Assessment/Plan:    Problem List Items Addressed This Visit        Other    COVID-19 virus infection - Primary     Acute symptomatic patient positive for covid 3/22/2021  Patient began with symptoms 3/19/2021 and continues with body aches, headache, fatigue, nausea, and occasional diarrhea  Recommend increase fluids, covid vitamins, off quarantine 3/29/2021  Patient to call with worsening of symptoms such as shortness of breath, chest pain, or increasing temperature  Patient verbalized understanding and agrees with treatment plan  Education provided on plasma donation  [address]    Your healthcare provider and/or public health staff have evaluated you and have determined that you do not need to remain in the hospital at this time  At this time you can be isolated at home where you will be monitored by staff from your local or state health department  You should carefully follow the prevention and isolation steps below until a healthcare provider or local or state health department says that you can return to your normal activities  Stay home except to get medical care    People who are mildly ill with COVID-19 are able to isolate at home during their illness  You should restrict activities outside your home, except for getting medical care  Do not go to work, school, or public areas  Avoid using public transportation, ride-sharing, or taxis  Separate yourself from other people and animals in your home    People: As much as possible, you should stay in a specific room and away from other people in your home  Also, you should use a separate bathroom, if available  Animals: You should restrict contact with pets and other animals while you are sick with COVID-19, just like you would around other people   Although there have not been reports of pets or other animals becoming sick with COVID-19, it is still recommended that people sick with COVID-19 limit contact with animals until more information is known about the virus  When possible, have another member of your household care for your animals while you are sick  If you are sick with COVID-19, avoid contact with your pet, including petting, snuggling, being kissed or licked, and sharing food  If you must care for your pet or be around animals while you are sick, wash your hands before and after you interact with pets and wear a facemask  See COVID-19 and Animals for more information  Call ahead before visiting your doctor    If you have a medical appointment, call the healthcare provider and tell them that you have or may have COVID-19  This will help the healthcare providers office take steps to keep other people from getting infected or exposed  Wear a facemask    You should wear a facemask when you are around other people (e g , sharing a room or vehicle) or pets and before you enter a healthcare providers office  If you are not able to wear a facemask (for example, because it causes trouble breathing), then people who live with you should not stay in the same room with you, or they should wear a facemask if they enter your room  Cover your coughs and sneezes    Cover your mouth and nose with a tissue when you cough or sneeze  Throw used tissues in a lined trash can  Immediately wash your hands with soap and water for at least 20 seconds or, if soap and water are not available, clean your hands with an alcohol-based hand  that contains at least 60% alcohol  Clean your hands often    Wash your hands often with soap and water for at least 20 seconds, especially after blowing your nose, coughing, or sneezing; going to the bathroom; and before eating or preparing food  If soap and water are not readily available, use an alcohol-based hand  with at least 60% alcohol, covering all surfaces of your hands and rubbing them together until they feel dry    Soap and water are the best option if hands are visibly dirty  Avoid touching your eyes, nose, and mouth with unwashed hands  Avoid sharing personal household items    You should not share dishes, drinking glasses, cups, eating utensils, towels, or bedding with other people or pets in your home  After using these items, they should be washed thoroughly with soap and water  Clean all high-touch surfaces everyday    High touch surfaces include counters, tabletops, doorknobs, bathroom fixtures, toilets, phones, keyboards, tablets, and bedside tables  Also, clean any surfaces that may have blood, stool, or body fluids on them  Use a household cleaning spray or wipe, according to the label instructions  Labels contain instructions for safe and effective use of the cleaning product including precautions you should take when applying the product, such as wearing gloves and making sure you have good ventilation during use of the product  Monitor your symptoms    Seek prompt medical attention if your illness is worsening (e g , difficulty breathing)  Before seeking care, call your healthcare provider and tell them that you have, or are being evaluated for, COVID-19  Put on a facemask before you enter the facility  These steps will help the healthcare providers office to keep other people in the office or waiting room from getting infected or exposed  Ask your healthcare provider to call the local or state health department  Persons who are placed under active monitoring or facilitated self-monitoring should follow instructions provided by their local health department or occupational health professionals, as appropriate  If you have a medical emergency and need to call 911, notify the dispatch personnel that you have, or are being evaluated for COVID-19  If possible, put on a facemask before emergency medical services arrive      Discontinuing home isolation    Patients with confirmed COVID-19 should remain under home isolation precautions until the following conditions are met:   - They have had no fever for at least 24 hours (that is one full day of no fever without the use medicine that reduces fevers)  AND  - other symptoms have improved (for example, when their cough or shortness of breath have improved)  AND  - If had mild or moderate illness, at least 10 days have passed since their symptoms first appeared or if severe illness (needed oxygen) or immunosuppressed, at least 20 days have passed since symptoms first appeared  Patients with confirmed COVID-19 should also notify close contacts (including their workplace) and ask that they self-quarantine  Currently, close contact is defined as being within 6 feet for 15 minutes or more from the period 24 hours starting 48 hours before symptom onset to the time at which the patient went into isolation  Close contacts of patients diagnosed with COVID-19 should be instructed by the patient to self-quarantine for 14 days from the last time of their last contact with the patient  Source: [employer]                  Disposition:     I recommended continued isolation until at least 24 hours have passed since recovery defined as resolution of fever without the use of fever-reducing medications AND improvement in COVID symptoms AND 10 days have passed since onset of symptoms (or 10 days have passed since date of first positive viral diagnostic test for asymptomatic patients)  I have spent 15 minutes directly with the patient  Greater than 50% of this time was spent in counseling/coordination of care regarding: instructions for management and patient and family education          Encounter provider KORY Sol    Provider located at [facility] AT [address] [address] [phone]    Recent Visits  Date Type Provider Dept   03/22/21 Telemedicine KORY [facility] [place]   03/22/21 Telephone [address]   Showing recent visits within past 7 days and meeting all other requirements     Today's Visits  Date Type Provider Dept   03/23/21 Telemedicine Shira [address], [address] today's visits and meeting all other requirements     Future Appointments  No visits were found meeting these conditions  Showing future appointments within next 150 days and meeting all other requirements      This virtual check-in was done via [employer] and patient was informed that this is not a secure, HIPAA-compliant platform  She agrees to proceed  Patient agrees to participate in a virtual check in via telephone or video visit instead of presenting to the office to address urgent/immediate medical needs  Patient is aware this is a billable service  After connecting through [place], the patient was identified by name and date of birth  Nina Lancaster was informed that this was a telemedicine visit and that the exam was being conducted confidentially over secure lines  My office door was closed  No one else was in the room  Nina Lancaster acknowledged consent and understanding of privacy and security of the telemedicine visit  I informed the patient that I have reviewed her record in Epic and presented the opportunity for her to ask any questions regarding the visit today  The patient agreed to participate  Subjective:   Nina Lancaster is a 32 y o  female who has been screened for COVID-19  Symptom change since last report: improving  Patient's symptoms include fatigue, nausea, diarrhea, myalgias and headache  Patient denies fever, chills, malaise, congestion, rhinorrhea, sore throat, anosmia, loss of taste, cough, shortness of breath, chest tightness, abdominal pain and vomiting  Aron Desai has been staying home and has isolated themselves in her home   She is taking care to not share personal items and is cleaning all surfaces that are touched often, like counters, tabletops, and doorknobs using household cleaning sprays or wipes  She is wearing a mask when she leaves her room  Date of symptom onset: 3/19/2021  Date of exposure: 3/18/2021  Date of positive COVID-19 PCR: 3/22/2021    Lab Results   Component Value Date    SARSCOV2 Positive (A) 03/22/2021    SARSCOV2 Not Detected 11/12/2020     Past Medical History:   Diagnosis Date    Chickenpox     HPV vaccine counseling     never had    Panic attack 6/17/2019    Positive depression screening 2/5/2019    Scalp cyst 11/22/2019    removed     Past Surgical History:   Procedure Laterality Date    LASIK Bilateral 01/19/2019    WISDOM TOOTH EXTRACTION       Current Outpatient Medications   Medication Sig Dispense Refill    ALPRAZolam (XANAX) 0 25 mg tablet Take 1 tablet (0 25 mg total) by mouth daily at bedtime as needed for anxiety 10 tablet 0    [employer] D3 50 MCG (2000 UT) CAPS TAKE 1 CAPSULE BY MOUTH EVERY DAY 90 capsule 0    fluticasone (FLONASE) 50 mcg/act nasal spray 2 sprays into each nostril daily 16 g 2    loratadine (Claritin) 10 mg tablet Take 1 tablet (10 mg total) by mouth daily 30 tablet 2     No current facility-administered medications for this visit  No Known Allergies    Review of Systems   Constitutional: Positive for fatigue  Negative for activity change, appetite change, chills and fever  HENT: Negative for congestion, postnasal drip, rhinorrhea, sneezing and sore throat  Respiratory: Negative for cough, chest tightness, shortness of breath and wheezing  Cardiovascular: Negative for chest pain and palpitations  Gastrointestinal: Positive for diarrhea and nausea  Negative for abdominal pain, constipation and vomiting  Genitourinary: Negative for dysuria and hematuria  Musculoskeletal: Positive for myalgias  Negative for arthralgias     Skin: Negative for color change, pallor and rash    Neurological: Positive for headaches  Negative for dizziness, weakness and light-headedness  Hematological: Negative for adenopathy  Psychiatric/Behavioral: Negative for agitation and confusion  Objective: There were no vitals filed for this visit  Physical Exam  Vitals signs and nursing note reviewed  Constitutional:       General: She is not in acute distress  Appearance: Normal appearance  She is not ill-appearing, toxic-appearing or diaphoretic  HENT:      Head: Normocephalic and atraumatic  Nose: No rhinorrhea  Eyes:      General: No scleral icterus  Right eye: No discharge  Left eye: No discharge  Neck:      Musculoskeletal: Normal range of motion  Pulmonary:      Effort: Pulmonary effort is normal  No respiratory distress  Musculoskeletal: Normal range of motion  Skin:     Coloration: Skin is not jaundiced or pale  Findings: No bruising, erythema, lesion or rash  Neurological:      Mental Status: She is alert and oriented to person, place, and time  Psychiatric:         Mood and Affect: Mood normal          Behavior: Behavior normal          Thought Content: Thought content normal          Judgment: Judgment normal        VIRTUAL VISIT DISCLAIMER    Ashia Gabriels acknowledges that she has consented to an online visit or consultation  She understands that the online visit is based solely on information provided by her, and that, in the absence of a face-to-face physical evaluation by the physician, the diagnosis she receives is both limited and provisional in terms of accuracy and completeness  This is not intended to replace a full medical face-to-face evaluation by the physician  Ashia Mike understands and accepts these terms

## 2021-03-23 NOTE — ASSESSMENT & PLAN NOTE
Acute symptomatic patient positive for covid 3/22/2021  Patient began with symptoms 3/19/2021 and continues with body aches, headache, fatigue, nausea, and occasional diarrhea  Recommend increase fluids, covid vitamins, off quarantine 3/29/2021  Patient to call with worsening of symptoms such as shortness of breath, chest pain, or increasing temperature  Patient verbalized understanding and agrees with treatment plan  Education provided on plasma donation  [address]    Your healthcare provider and/or public health staff have evaluated you and have determined that you do not need to remain in the hospital at this time  At this time you can be isolated at home where you will be monitored by staff from your local or state health department  You should carefully follow the prevention and isolation steps below until a healthcare provider or local or state health department says that you can return to your normal activities  Stay home except to get medical care    People who are mildly ill with COVID-19 are able to isolate at home during their illness  You should restrict activities outside your home, except for getting medical care  Do not go to work, school, or public areas  Avoid using public transportation, ride-sharing, or taxis  Separate yourself from other people and animals in your home    People: As much as possible, you should stay in a specific room and away from other people in your home  Also, you should use a separate bathroom, if available  Animals: You should restrict contact with pets and other animals while you are sick with COVID-19, just like you would around other people  Although there have not been reports of pets or other animals becoming sick with COVID-19, it is still recommended that people sick with COVID-19 limit contact with animals until more information is known about the virus   When possible, have another member of your household care for your animals while you are sick  If you are sick with COVID-19, avoid contact with your pet, including petting, snuggling, being kissed or licked, and sharing food  If you must care for your pet or be around animals while you are sick, wash your hands before and after you interact with pets and wear a facemask  See COVID-19 and Animals for more information  Call ahead before visiting your doctor    If you have a medical appointment, call the healthcare provider and tell them that you have or may have COVID-19  This will help the healthcare providers office take steps to keep other people from getting infected or exposed  Wear a facemask    You should wear a facemask when you are around other people (e g , sharing a room or vehicle) or pets and before you enter a healthcare providers office  If you are not able to wear a facemask (for example, because it causes trouble breathing), then people who live with you should not stay in the same room with you, or they should wear a facemask if they enter your room  Cover your coughs and sneezes    Cover your mouth and nose with a tissue when you cough or sneeze  Throw used tissues in a lined trash can  Immediately wash your hands with soap and water for at least 20 seconds or, if soap and water are not available, clean your hands with an alcohol-based hand  that contains at least 60% alcohol  Clean your hands often    Wash your hands often with soap and water for at least 20 seconds, especially after blowing your nose, coughing, or sneezing; going to the bathroom; and before eating or preparing food  If soap and water are not readily available, use an alcohol-based hand  with at least 60% alcohol, covering all surfaces of your hands and rubbing them together until they feel dry  Soap and water are the best option if hands are visibly dirty  Avoid touching your eyes, nose, and mouth with unwashed hands      Avoid sharing personal household items    You should not share dishes, drinking glasses, cups, eating utensils, towels, or bedding with other people or pets in your home  After using these items, they should be washed thoroughly with soap and water  Clean all high-touch surfaces everyday    High touch surfaces include counters, tabletops, doorknobs, bathroom fixtures, toilets, phones, keyboards, tablets, and bedside tables  Also, clean any surfaces that may have blood, stool, or body fluids on them  Use a household cleaning spray or wipe, according to the label instructions  Labels contain instructions for safe and effective use of the cleaning product including precautions you should take when applying the product, such as wearing gloves and making sure you have good ventilation during use of the product  Monitor your symptoms    Seek prompt medical attention if your illness is worsening (e g , difficulty breathing)  Before seeking care, call your healthcare provider and tell them that you have, or are being evaluated for, COVID-19  Put on a facemask before you enter the facility  These steps will help the healthcare providers office to keep other people in the office or waiting room from getting infected or exposed  Ask your healthcare provider to call the local or [facility] health department  Persons who are placed under active monitoring or facilitated self-monitoring should follow instructions provided by their local health department or occupational health professionals, as appropriate  If you have a medical emergency and need to call 911, notify the dispatch personnel that you have, or are being evaluated for COVID-19  If possible, put on a facemask before emergency medical services arrive      Discontinuing home isolation    Patients with confirmed COVID-19 should remain under home isolation precautions until the following conditions are met:   - They have had no fever for at least 24 hours (that is one full day of no fever without the use medicine that reduces fevers)  AND  - other symptoms have improved (for example, when their cough or shortness of breath have improved)  AND  - If had mild or moderate illness, at least 10 days have passed since their symptoms first appeared or if severe illness (needed oxygen) or immunosuppressed, at least 20 days have passed since symptoms first appeared  Patients with confirmed COVID-19 should also notify close contacts (including their workplace) and ask that they self-quarantine  Currently, close contact is defined as being within 6 feet for 15 minutes or more from the period 24 hours starting 48 hours before symptom onset to the time at which the patient went into isolation  Close contacts of patients diagnosed with COVID-19 should be instructed by the patient to self-quarantine for 14 days from the last time of their last contact with the patient       Source: [employer]

## 2021-04-25 ENCOUNTER — TELEPHONE (OUTPATIENT)
Dept: FAMILY MEDICINE CLINIC | Facility: CLINIC | Age: 31
End: 2021-04-25

## 2021-04-26 ENCOUNTER — TELEPHONE (OUTPATIENT)
Dept: PSYCHIATRY | Facility: CLINIC | Age: 31
End: 2021-04-26

## 2021-05-20 ENCOUNTER — TELEPHONE (OUTPATIENT)
Dept: PSYCHIATRY | Facility: CLINIC | Age: 31
End: 2021-05-20

## 2021-06-19 DIAGNOSIS — E55.9 VITAMIN D DEFICIENCY: ICD-10-CM

## 2021-06-19 RX ORDER — ACETAMINOPHEN 160 MG
TABLET,DISINTEGRATING ORAL
Qty: 90 CAPSULE | Refills: 0 | Status: SHIPPED | OUTPATIENT
Start: 2021-06-19 | End: 2021-09-23

## 2021-07-02 ENCOUNTER — TELEPHONE (OUTPATIENT)
Dept: PSYCHIATRY | Facility: CLINIC | Age: 31
End: 2021-07-02

## 2021-07-06 ENCOUNTER — TELEPHONE (OUTPATIENT)
Dept: PSYCHIATRY | Facility: CLINIC | Age: 31
End: 2021-07-06

## 2021-07-06 ENCOUNTER — TELEPHONE (OUTPATIENT)
Dept: FAMILY MEDICINE CLINIC | Facility: CLINIC | Age: 31
End: 2021-07-06

## 2021-07-06 DIAGNOSIS — F41.0 PANIC ATTACK: Primary | ICD-10-CM

## 2021-07-06 NOTE — TELEPHONE ENCOUNTER
Behavorial Health Outpatient Intake Questions    Referred by: PCP    Please advised interviewee that they need to answer all questions truthfully to allow for best care and any misrepresentations of information may affect their ability to be seen at this clinic   => Was this discussed? Yes     [facility]     Presenting Problem (in patient's words):   Panic attacks  Patient states she's claustrophobic, especially on airplanes  Are there any developmental disabilities? ? If yes, can they speak to you on the phone? If they are too limited to speak to you on phone, refer out No    Are you taking any psychiatric medications? No    => If yes, who prescribes? If yes, are they injectable medications? Does the patient have a language barrier or hearing impairment? No    Have you been treated at [facility] by a therapist or a doctor in the past? If yes, who? No    Has the patient been hospitalized for mental health? No   If yes, how long ago was last hospitalization and where was it? Do you actively use alcohol or marijuana or illegal substances? If yes, what and how much - refer out to Drug and alcohol treatment if use is excessive or daily use of illegal substances No concerns of substance abuse are reported  Do you have a community treatment team or ? No    Legal History-     Does the patient have any history of arrests, senior living/[facility] time, or DUIs? No  If Yes-  1) What types of charges? 2) When were they last incarcerated? 3) Are they currently on parole or probation? Minor Child-    Who has custody of the child? Is there a custody agreement? If there is a custody agreement remind parent that they must bring a copy to the first appt or they will not be seen       Intake Team, please check with provider before scheduling if flags come up such as:  - complex case  - legal history (other than DUI)  - communication barrier concerns are present  - if, in your judgment, this needs further review    ACCEPTED as a patient Yes  => Appointment Date: 07/14/2021 at 3:30 p m with Dr Karyle Loosen? No    Name of Insurance Co: [facility] [employer] ID# [id number]  [id number] Phone #  If ins is primary or secondary  If patient is a minor, parents information such as Name, D  O B of guarantor

## 2021-07-07 ENCOUNTER — OFFICE VISIT (OUTPATIENT)
Dept: FAMILY MEDICINE CLINIC | Facility: CLINIC | Age: 31
End: 2021-07-07
Payer: COMMERCIAL

## 2021-07-07 VITALS
BODY MASS INDEX: 23.05 KG/M2 | RESPIRATION RATE: 16 BRPM | TEMPERATURE: 99.2 F | OXYGEN SATURATION: 97 % | WEIGHT: 135 LBS | HEART RATE: 87 BPM | SYSTOLIC BLOOD PRESSURE: 112 MMHG | HEIGHT: 64 IN | DIASTOLIC BLOOD PRESSURE: 62 MMHG

## 2021-07-07 DIAGNOSIS — Z00.01 ENCOUNTER FOR ROUTINE ADULT PHYSICAL EXAM WITH ABNORMAL FINDINGS: Primary | ICD-10-CM

## 2021-07-07 DIAGNOSIS — Z28.21 COVID-19 VACCINE DOSE DECLINED: ICD-10-CM

## 2021-07-07 DIAGNOSIS — F32.89 OTHER DEPRESSION: ICD-10-CM

## 2021-07-07 PROBLEM — F32.A DEPRESSION: Status: ACTIVE | Noted: 2021-07-07

## 2021-07-07 PROBLEM — Z20.822 EXPOSURE TO COVID-19 VIRUS: Status: RESOLVED | Noted: 2020-11-12 | Resolved: 2021-07-07

## 2021-07-07 PROBLEM — R53.83 OTHER FATIGUE: Status: RESOLVED | Noted: 2020-06-05 | Resolved: 2021-07-07

## 2021-07-07 PROBLEM — IMO0002 LUMP: Status: RESOLVED | Noted: 2019-09-12 | Resolved: 2021-07-07

## 2021-07-07 PROBLEM — U07.1 COVID-19 VIRUS INFECTION: Status: RESOLVED | Noted: 2021-03-23 | Resolved: 2021-07-07

## 2021-07-07 PROCEDURE — 99395 PREV VISIT EST AGE 18-39: CPT | Performed by: FAMILY MEDICINE

## 2021-07-07 PROCEDURE — 1036F TOBACCO NON-USER: CPT | Performed by: FAMILY MEDICINE

## 2021-07-07 PROCEDURE — 3008F BODY MASS INDEX DOCD: CPT | Performed by: FAMILY MEDICINE

## 2021-07-07 RX ORDER — MULTIVITAMIN
1 TABLET ORAL DAILY
COMMUNITY
End: 2022-03-23 | Stop reason: ALTCHOICE

## 2021-07-07 NOTE — PROGRESS NOTES
ADULT ANNUAL PHYSICAL  [address] PRIMARY CARE [facility]    NAME: Yanique Ramos  AGE: 32 y o  SEX: female  : 1990     DATE: 2021     Assessment and Plan:     Problem List Items Addressed This Visit        Other    Depression      A new diagnosis the positive the PHQ-9 = 9   a patient already seeing therapist the periodically we did refer the patient to psychiatric appointment next couple week         Encounter for routine adult physical exam with abnormal findings - Primary      Advice and education were given regarding nutrition, aerobic exercises, weight bearing exercises, cardiovascular risk reduction, fall risk reduction, and age appropriate supplements  The patient was counseled regarding instructions for management, risk factor reductions, prognosis, risks and benefits of treatment options, patient and family education, and importance of compliance with treatment  Other Visit Diagnoses     COVID-19 vaccine dose declined         patient declined COVID vaccine          Immunizations and preventive care screenings were discussed with patient today  Appropriate education was printed on patient's after visit summary  Counseling:  Alcohol/drug use: discussed moderation in alcohol intake, the recommendations for healthy alcohol use, and avoidance of illicit drug use  Dental Health: discussed importance of regular tooth brushing, flossing, and dental visits  Injury prevention: discussed safety/seat belts, safety helmets, smoke detectors, carbon dioxide detectors, and smoking near bedding or upholstery  Sexual health: discussed sexually transmitted diseases, partner selection, use of condoms, avoidance of unintended pregnancy, and contraceptive alternatives  · Exercise: the importance of regular exercise/physical activity was discussed  Recommend exercise 3-5 times per week for at least 30 minutes         Depression Screening and Follow-up Plan: Patient's depression screening was positive with a PHQ-2 score of 3  Their PHQ-9 score was 9  Patient assessed for underlying major depression  Brief counseling provided and recommend additional follow-up/re-evaluation next office visit  No follow-ups on file  Chief Complaint:     Chief Complaint   Patient presents with    Physical Exam     patient is here today for her yearly physical exam      History of Present Illness:     Adult Annual Physical   Patient here for a comprehensive physical exam  The patient reports no problems  Diet and Physical Activity  · Diet/Nutrition: No special diet  · Exercise: no formal exercise  Depression Screening  PHQ-9 Depression Screening    PHQ-9:   Frequency of the following problems over the past two weeks:      Little interest or pleasure in doing things: 2 - more than half the days  Feeling down, depressed, or hopeless: 1 - several days  Trouble falling or staying asleep, or sleeping too much: 0 - not at all  Feeling tired or having little energy: 3 - nearly every day  Poor appetite or overeatin - not at all  Feeling bad about yourself - or that you are a failure or have let yourself or your family down: 1 - several days  Trouble concentrating on things, such as reading the newspaper or watching television: 2 - more than half the days  Moving or speaking so slowly that other people could have noticed  Or the opposite - being so fidgety or restless that you have been moving around a lot more than usual: 0 - not at all  Thoughts that you would be better off dead, or of hurting yourself in some way: 0 - not at all  PHQ-2 Score: 3  PHQ-9 Score: 9       General Health  · Sleep: sleeps well  · Hearing: normal - bilateral   · Vision: previous LASIK surgery  · Dental: regular dental visits  /GYN Health  · Last menstrual period: 2 week ago  · Contraceptive method: None    · History of STDs?: no      Review of Systems:     Review of Systems   Constitutional: Negative for activity change, appetite change, fatigue and fever  HENT: Negative for congestion, ear pain, sinus pressure, sinus pain and sore throat  Eyes: Negative for pain, discharge, redness and itching  Respiratory: Negative for cough, chest tightness, shortness of breath and stridor  Cardiovascular: Negative for chest pain, palpitations and leg swelling  Gastrointestinal: Negative for abdominal pain, blood in stool, constipation, diarrhea and nausea  Endocrine: Negative for heat intolerance and polydipsia  Genitourinary: Negative for dysuria, flank pain, frequency and hematuria  Musculoskeletal: Negative for back pain, joint swelling and neck pain  Skin: Negative for pallor and rash  Neurological: Negative for dizziness, tremors, weakness, numbness and headaches  Hematological: Does not bruise/bleed easily        Past Medical History:     Past Medical History:   Diagnosis Date    Chickenpox     COVID-19 virus infection 3/23/2021    Depression 7/7/2021    Encounter for well adult exam with abnormal findings 6/5/2020    Exposure to COVID-19 virus 11/12/2020    HPV vaccine counseling     never had    Lump 9/12/2019    Other fatigue 6/5/2020    Panic attack 6/17/2019    Positive depression screening 2/5/2019    Scalp cyst 11/22/2019    removed      Past Surgical History:     Past Surgical History:   Procedure Laterality Date    LASIK Bilateral 01/19/2019    WISDOM TOOTH EXTRACTION        Social History:     Social History     Socioeconomic History    Marital status: Single     Spouse name: None    Number of children: 0    Years of education: None    Highest education level: Master's degree (e g , MA, MS, Hugh, MEd, MSW, CRAIG)   Occupational History    Occupation: teacher     Comment: [employer]   Tobacco Use    Smoking status: Never Smoker    Smokeless tobacco: Never Used   Vaping Use    Vaping Use: Never used   Substance and Sexual Activity  Alcohol use: Not Currently     Alcohol/week: 0 0 - 1 0 standard drinks     Comment: holidays    Drug use: No    Sexual activity: Never   Other Topics Concern    None   Social History Narrative    Yazidism: [place] Zoroastrianism    Accepts blood products     Social Determinants of Health     Financial Resource Strain: Low Risk     Difficulty of Paying Living Expenses: Not hard at all   Food Insecurity: No Food Insecurity    Worried About Running Out of Food in the Last Year: Never true    Yamile of Food in the Last Year: Never true   Transportation Needs: No Transportation Needs    Lack of Transportation (Medical): No    Lack of Transportation (Non-Medical): No   Physical Activity: Sufficiently Active    Days of Exercise per Week: 3 days    Minutes of Exercise per Session: 60 min   Stress: Stress Concern Present    Feeling of Stress : Rather much   Social Connections:  Moderately Isolated    Frequency of Communication with Friends and Family: Twice a week    Frequency of Social Gatherings with Friends and Family: More than three times a week    Attends Church Services: More than 4 times per year    Active Member of [employer] or Organizations: No    Attends Club or Organization Meetings: Never    Marital Status: Never    Intimate Partner Violence: Not At Risk    Fear of Current or Ex-Partner: No    Emotionally Abused: No    Physically Abused: No    Sexually Abused: No      Family History:     Family History   Problem Relation Age of Onset    Stroke Father     Sarcoidosis Mother     No Known Problems Sister     Diabetes Maternal Grandmother     Hypertension Maternal Grandmother     Diabetes Maternal Grandfather     Hypertension Maternal Grandfather     Stomach cancer Paternal Grandmother     Breast cancer Neg Hx     Ovarian cancer Neg Hx     Colon cancer Neg Hx       Current Medications:     Current Outpatient Medications   Medication Sig Dispense Refill    Cholecalciferol (Vitamin D3) 50 MCG (2000 UT) capsule TAKE 1 CAPSULE BY MOUTH EVERY DAY 90 capsule 0    Multiple Vitamin (multivitamin) tablet Take 1 tablet by mouth daily      ALPRAZolam (XANAX) 0 25 mg tablet Take 1 tablet (0 25 mg total) by mouth daily at bedtime as needed for anxiety (Patient not taking: Reported on 7/7/2021) 10 tablet 0     No current facility-administered medications for this visit  Allergies:     No Known Allergies   Physical Exam:     /62 (BP Location: Left arm, Patient Position: Sitting, Cuff Size: Adult)   Pulse 87   Temp 99 2 °F (37 3 °C) (Tympanic)   Resp 16   Ht 5' 3 5" (1 613 m)   Wt 61 2 kg (135 lb)   SpO2 97%   BMI 23 54 kg/m²     Physical Exam  Vitals and nursing note reviewed  Constitutional:       General: She is not in acute distress  Appearance: She is well-developed and normal weight  She is not toxic-appearing or diaphoretic  HENT:      Head: Normocephalic and atraumatic  Right Ear: Tympanic membrane, ear canal and external ear normal  There is no impacted cerumen  Left Ear: Tympanic membrane, ear canal and external ear normal  There is no impacted cerumen  Nose: Nose normal  No congestion or rhinorrhea  Mouth/Throat:      Mouth: Mucous membranes are moist       Pharynx: Oropharynx is clear  No oropharyngeal exudate or posterior oropharyngeal erythema  Eyes:      General: No scleral icterus  Right eye: No discharge  Left eye: No discharge  Conjunctiva/sclera: Conjunctivae normal       Pupils: Pupils are equal, round, and reactive to light  Neck:      Thyroid: No thyromegaly  Cardiovascular:      Rate and Rhythm: Normal rate and regular rhythm  Pulses: Normal pulses  Heart sounds: Normal heart sounds  No murmur heard  No friction rub  Pulmonary:      Effort: Pulmonary effort is normal  No respiratory distress  Breath sounds: Normal breath sounds  No wheezing or rales  Chest:      Chest wall: No tenderness  Abdominal:      General: There is no distension  Palpations: Abdomen is soft  There is no mass  Tenderness: There is no abdominal tenderness  Hernia: No hernia is present  There is no hernia in the left inguinal area  Genitourinary:     Labia:         Right: No rash  Left: No rash  Vagina: No foreign body  No vaginal discharge, tenderness or bleeding  Cervix: No cervical motion tenderness  Adnexa:         Right: No mass or tenderness  Left: No mass or tenderness  Musculoskeletal:         General: Normal range of motion  Cervical back: Normal range of motion  No rigidity  Lymphadenopathy:      Cervical: No cervical adenopathy  Skin:     General: Skin is warm  Coloration: Skin is not pale  Findings: No erythema or rash  Neurological:      Mental Status: She is alert and oriented to person, place, and time  Sensory: No sensory deficit  Motor: No weakness or abnormal muscle tone        Gait: Gait normal       Deep Tendon Reflexes: Reflexes normal    Psychiatric:         Mood and Affect: Mood normal          Behavior: Behavior normal           Merrill Stephen MD   [address] [place] [address]

## 2021-07-07 NOTE — PATIENT INSTRUCTIONS

## 2021-07-08 NOTE — ASSESSMENT & PLAN NOTE
A new diagnosis the positive the PHQ-9 = 9   a patient already seeing therapist the periodically we did refer the patient to psychiatric appointment next couple week

## 2021-07-14 ENCOUNTER — OFFICE VISIT (OUTPATIENT)
Dept: PSYCHIATRY | Facility: CLINIC | Age: 31
End: 2021-07-14
Payer: COMMERCIAL

## 2021-07-14 DIAGNOSIS — F40.01 PANIC DISORDER WITH AGORAPHOBIA: Primary | ICD-10-CM

## 2021-07-14 DIAGNOSIS — F41.1 GAD (GENERALIZED ANXIETY DISORDER): ICD-10-CM

## 2021-07-14 PROCEDURE — 90792 PSYCH DIAG EVAL W/MED SRVCS: CPT | Performed by: PSYCHIATRY & NEUROLOGY

## 2021-07-14 RX ORDER — PAROXETINE 10 MG/1
10 TABLET, FILM COATED ORAL DAILY
Qty: 30 TABLET | Refills: 1 | Status: SHIPPED | OUTPATIENT
Start: 2021-07-14 | End: 2021-09-16

## 2021-07-14 RX ORDER — ALPRAZOLAM 1 MG/1
TABLET ORAL
Qty: 10 TABLET | Refills: 0 | Status: SHIPPED | OUTPATIENT
Start: 2021-07-14 | End: 2022-01-13 | Stop reason: SDUPTHER

## 2021-07-14 NOTE — PSYCH
[address]    Name and Date of Birth:  Consuelo Hylton 32 y o  1990 MRN: [id number]    Date of Visit: July 14, 2021    Reason for visit: Full psychiatric intake assessment for medication management     HPI       Consuelo Hylton is a 32 y o , [place], female, migrated to the [address] [place] [address] at age 13, single, domiciled with her parents in [facility], currently employed as an ,  [address] [facility] significant for h/o panic attacks, no past psychiatric hospitalizations, no past suicide attempts or self injurious behaviors, no significant PMH, no substance abuse history, who presents to [facility] outpatient clinic on referral from Dr Warden Christie for psychiatric evaluaion  Lear Lupillo reports that 4 yrs ago, she was stuck in an elevator in [facility], at > 100 degrees F, causing her first major panic attack, with symptoms of shortness of breath, palpitations and intense fear because she felt as thought she was dying  Shortly after she started having these panic attacks in closed spaces and especially on aeroplanes  In February/2020 she was prescribed 1 tab (0 25 mg) for a flight to [facility] and found it helpful  However, in November she took 1 tab, but continued to panic and took another tab 1/2 hr later on the plane  On the return flight back she needed 3 tabs, an so is worried about needing increased doses each time she flies  She denies any recent panic attacks but reports increase feelings of anxiety whenever she sees a plane or even have thoughts of flying  She admits to no longer taking elevators, but admits to increased feelings of panic in any enclosed space (ex  Public bathroom with one door)  She admits to feeling as though she's missing out on a lot with not being able to fly and enjoy her life, due to her increased fear of having worsening panic attacks while flying      She reports increased depressed mood during [employer], which she attributes to being "stuck and home and having time to process a lot of traumatic issues from her past " She reports 10 yrs ago, her dad cheated on mom, leading to her mom's attempted suicide  She admits to her boyfriend at the time breaking up with her, because his family found her father's extramarital affairs inappropriate  She admits to struggling with being single without children, and states it's difficult to find friends to do things with because everyone she knows has children  She reports limited social support  On psychiatric ROS, patient reports good sleep and appetite, but endorses anhedonia (enjoying baking business less, connecting less with people), decreased energy and concentration, denies feelings of guilt, reports feelings of hopelessness about being 31 and remaining single without children  She denies any suicidal ideations, intent or plans  She admits to "depressed" mood over past trauma  She admits to feelings of loneliness and feelings of jealousy watching her close friends and family all get  and start a family  She denies any auditory/visual hallucinations, delusions, paranoia, or symptoms of patricia  Current Rating Scores:     Current PHQ-9   PHQ-9 Score (since 6/13/2021)     PHQ-9 Score  9        Current DES-7 is 6      Psychiatric Review Of Systems:    Sleep changes: no  Appetite changes: no  Weight changes: no  Energy/anergy: yes, decreased  Interest/pleasure/anhedonia: yes, decreased  Somatic symptoms: no  Anxiety/panic: yes, panic attacks, worrying  Patricia: no  Guilty/hopeless: yes  Self injurious behavior/risky behavior: no  Suicidal ideation: no  Homicidal ideation: no  Auditory hallucinations: no  Visual hallucinations: no  Other hallucinations: no  Delusional thinking: no  Eating disorder history: no  Obsessive/compulsive symptoms: no    Review Of Systems:    Constitutional negative   ENT negative   Cardiovascular negative   Respiratory negative   Gastrointestinal negative   Genitourinary negative   Musculoskeletal negative   Integumentary negative   Neurological negative   Endocrine negative   Other Symptoms none, all other systems are negative       Family Psychiatric History:   Mom: depression, suicide attempt (2007)  Sister: suicide attempt       Past Psychiatric History:     Past Inpatient Psychiatric Treatment:   No history of past inpatient psychiatric admissions  Past Outpatient Psychiatric Treatment:    No history of past outpatient psychiatric treatment  Currently in therapy with Allyn Parker at [facility] since January  Past Suicide Attempts: no  Past Violent Behavior: no  Past Psychiatric Medication Trials: Xanax        Substance Abuse History:    No history of ETOH, illict substance, or tobacco abuse  No past legal actions or arrests secondary to substance intoxication  The patient denies prior DWIs/DUIs  No history of outpatient/inpatient rehabilitation programs  Pranay Rodriguez does not exhibit objective evidence of substance withdrawal during today's examination nor does Pranay Rodriguez appear under the influence of any psychoactive substance  Social History:    Developmental: Denies a history of milestone/developmental delay  Denies a history of in-utero exposure to toxins/illicit substances  There is no documented history of IEP or need for special education  Migrated from [place] at age 13  Education: Masters in Education  Marital history: single  Living arrangement, social support: parents, strained relationship with sister  Occupational History:  [employer], [employer] on the side  Access to firearms: Denies direct access to weapons/firearms  Deepafelicia Cabrera has no history of arrests or violence with a deadly weapon  Hx of  service: denies  Prior incarcerations or legal issues: denies    Traumatic History:     Abuse:none is reported  Other Traumatic Events: Mother's suicide attempt after father's affair  Ex-boyfriend cheating on her after 3 years in 2011  Past Medical History:    Past Medical History:   Diagnosis Date    Chickenpox     COVID-19 virus infection 3/23/2021    Depression 7/7/2021    Encounter for well adult exam with abnormal findings 6/5/2020    Exposure to COVID-19 virus 11/12/2020    HPV vaccine counseling     never had    Lump 9/12/2019    Other fatigue 6/5/2020    Panic attack 6/17/2019    Positive depression screening 2/5/2019    Scalp cyst 11/22/2019    removed     Past Medical History Pertinent Negatives:   Diagnosis Date Noted    Allergic 06/07/2019    Anemia 06/07/2019    Arthritis 06/07/2019    Asthma 06/07/2019    Cancer ([facility] ) 06/07/2019    Chronic kidney disease 06/07/2019    Clotting disorder ([facility] ) 06/07/2019    COPD (chronic obstructive pulmonary disease) ([facility] ) 06/07/2019    Coronary artery disease 06/07/2019    Dementia ([facility] ) 06/07/2019    Diabetes mellitus ([facility] ) 06/07/2019    Disease of thyroid gland 06/07/2019    Diverticulitis of colon 06/07/2019    Eating disorder 06/07/2019    GERD (gastroesophageal reflux disease) 06/07/2019    Heart murmur 06/07/2019    HL (hearing loss) 06/07/2019    Hypertension 06/07/2019    Infectious viral hepatitis 06/07/2019    Inflammatory bowel disease 06/07/2019    Kidney stone 06/07/2019    Memory loss 06/07/2019    Myocardial infarction [facility] ) 06/07/2019    Obesity 06/07/2019    Osteoporosis 06/07/2019    Otitis media 06/07/2019    Pneumonia 06/07/2019    Scoliosis 06/07/2019    Seizures ([facility] ) 06/07/2019    Shingles 06/07/2019    Stroke ([facility] ) 06/07/2019    Substance abuse ([facility] ) 06/07/2019    Urinary tract infection 06/07/2019    Visual impairment 06/07/2019     Past Surgical History:   Procedure Laterality Date    LASIK Bilateral 01/19/2019    WISDOM TOOTH EXTRACTION       No Known Allergies    History Review:     The following portions of the patient's history were reviewed and updated as appropriate: allergies, current medications, past family history, past medical history, past social history, past surgical history and problem list     OBJECTIVE:    Vital signs in last 24 hours: There were no vitals filed for this visit      Mental Status Evaluation:    Appearance age appropriate, casually dressed   Behavior pleasant, cooperative, mildly anxious   Speech normal rate, normal volume, normal pitch   Mood normal   Affect normal range and intensity, appropriate   Thought Processes organized, goal directed   Associations intact associations   Thought Content no overt delusions   Perceptual Disturbances: no auditory hallucinations, no visual hallucinations   Abnormal Thoughts  Risk Potential Suicidal ideation - None  Homicidal ideation - None  Potential for aggression - No   Orientation oriented to person, place, time/date and situation   Memory recent and remote memory grossly intact   Consciousness alert and awake   Attention Span Concentration Span attention span and concentration are age appropriate   Intellect appears to be of average intelligence   Insight intact   Judgement intact   Muscle Strength and  Gait normal muscle strength and normal muscle tone, normal gait and normal balance   Motor Activity no abnormal movements   Language no difficulty naming common objects, no difficulty repeating a phrase, no difficulty writing a sentence   Fund of Knowledge adequate knowledge of current events  adequate fund of knowledge regarding past history  adequate fund of knowledge regarding vocabulary    Pain none   Pain Scale 0       Laboratory Results: I have personally reviewed all pertinent laboratory/tests results    Recent Labs (last 6 months):   Orders Only on 03/22/2021   Component Date Value    SARS-CoV-2 03/22/2021 Positive*       Suicide/Homicide Risk Assessment:    Risk of Harm to Self:  The following ratings are based on assessment at the time of the interview  Based on today's assessment, Derek tobias the following risk of harm to self: none    Risk of Harm to Others: The following ratings are based on assessment at the time of the interview  Based on today's assessment, Kaitlin Larsen presents the following risk of harm to others: none    The following interventions are recommended: contracts for safety at present - agrees to call Crisis Intervention Service if feeling unsafe  Although patient's acute lethality risk is LOW, long-term/chronic lethality risk is mildly elevated given feelings of hopelessness and depressed mood at times  However, at the current moment, Kaitlin Larsen is future-oriented, forward-thinking, and demonstrates ability to act in a self-preserving manner as evidenced by volitionally presenting to the clinic today, seeking treatment  Additionally, Kaitlin Larsen sits throughout the assessment wearing personal protective gear (ie mask) in the context of an ongoing viral pandemic, suggesting a will and desire to live  At this juncture, inpatient hospitalization is not currently warranted  To mitigate future risk, patient should adhere to treatment recommendations, avoid alcohol/illicit substance use, utilize community-based resources and familiar support, and prioritize mental health treatment  Assessment/Plan:       DSM-V Diagnoses:     Diagnoses and all orders for this visit:    Panic disorder with agoraphobia  -     PARoxetine (PAXIL) 10 mg tablet; Take 1 tablet (10 mg total) by mouth daily for 30 doses  -     ALPRAZolam (XANAX) 1 mg tablet; Take 1 tablet 1 hour prior to flight    DES (generalized anxiety disorder)  -     Ambulatory referral to Psychiatry  -     PARoxetine (PAXIL) 10 mg tablet; Take 1 tablet (10 mg total) by mouth daily for 30 doses          Treatment Recommendations/Precautions:     Admit to [facility] outpatient clinic for treatment of Panic disorder and DES   Start Paxil 10mg QD for Panic d/o and DES   Pt advised to take 5 mg the first 3-5 days, and then increase to 10 mg if no side effects   Plan for Xanax 1mg PRN when flying on short flights  (Plan for Klonopin for long flights (ex [place]) in the future)  Betty Cage [id number]- F/u with primary care provider for on-going medical care   Follow-up with this provider in 4 weeks  Medication management every 4 weeks  Aware of 24 hour and weekend coverage for urgent situations accessed by calling [facility] main practice number    Medications Risks/Benefits:      Risks, Benefits And Possible Side Effects Of Medications:    Risks, benefits, and possible side effects of medications explained to [facility] and she verbalizes understanding and agreement for treatment  PARQ completed including serotonin syndrome, SIADH, worsening depression, suicidality, induction of thony, GI upset, headaches, activation, sexual side effects, sedation, potential drug interactions, and others  Controlled Medication Discussion:     Not applicable    Treatment Plan:    Completed and signed during the session: Yes - Treatment Plan done but not signed at time of office visit due to:  Plan reviewed in person and verbal consent given due to [place] social distancing      Note Share Disclaimer: This note was shared with patient        Ruth Serrano MD 07/14/21

## 2021-07-14 NOTE — PATIENT INSTRUCTIONS
Take Paxil 1/2 tablet (5mg) daily for 3 days, then increase to 1 tab (10mg) daily if no side effects

## 2021-07-14 NOTE — BH TREATMENT PLAN
TREATMENT PLAN (Medication Management Only)        [employer]    Name and Date of Birth:  Gregor Rain 32 y o  1990  Date of Treatment Plan: July 14, 2021  Diagnosis/Diagnoses:    1  Panic disorder with agoraphobia    2  DES (generalized anxiety disorder)      Strengths/Personal Resources for Self-Care: ability to communicate well, average or above intelligence, financial security, general fund of knowledge, good physical health, ability to negotiate basic needs, stable employment, willingness to work on problems  Area/Areas of need (in own words): anxiety, depressive symptoms, panic attacks when flying  1  Long Term Goal: "alleviation of panic disorder", alleviate anxiety  Target Date: 6 months - 1/14/2022  Person/Persons responsible for completion of goal: Nicole Owens  2  Short Term Objective (s) - How will we reach this goal?:   A  Provider new recommended medication/dosage changes and/or continue medication(s): start paxil 10 mg daily  B   Start taking short flights within the [place]     Target Date: 4 weeks - 8/11/2021  Person/Persons Responsible for Completion of Goal: Nicole Owens  Progress Towards Goals: stable  Treatment Modality: medication management every 4 weeks  Review due 6 months from date of this plan: 6 months - 1/14/2022  Expected length of service: maintenance unless revised  My Physician/PA/NP and I have developed this plan together and I agree to work on the goals and objectives  I understand the treatment goals that were developed for my treatment

## 2021-07-22 ENCOUNTER — CLINICAL SUPPORT (OUTPATIENT)
Dept: OBGYN CLINIC | Facility: CLINIC | Age: 31
End: 2021-07-22
Payer: COMMERCIAL

## 2021-07-22 VITALS — SYSTOLIC BLOOD PRESSURE: 110 MMHG | BODY MASS INDEX: 23.26 KG/M2 | DIASTOLIC BLOOD PRESSURE: 68 MMHG | WEIGHT: 133.4 LBS

## 2021-07-22 DIAGNOSIS — Z23 NEED FOR HPV VACCINATION: Primary | ICD-10-CM

## 2021-07-22 PROCEDURE — 90471 IMMUNIZATION ADMIN: CPT | Performed by: OBSTETRICS & GYNECOLOGY

## 2021-07-22 PROCEDURE — 96372 THER/PROPH/DIAG INJ SC/IM: CPT | Performed by: OBSTETRICS & GYNECOLOGY

## 2021-07-22 PROCEDURE — 90651 9VHPV VACCINE 2/3 DOSE IM: CPT | Performed by: OBSTETRICS & GYNECOLOGY

## 2021-07-22 RX ORDER — GARLIC 200 MG
TABLET ORAL
COMMUNITY
End: 2022-03-23 | Stop reason: ALTCHOICE

## 2021-07-22 NOTE — PROGRESS NOTES
Pt here in office for 3rd benji injection  Pt tolerated injection in left upper arm without any complications      Chana Chiang 47: [id number]  LOT: [id number]  EXP: 02/21/2023

## 2021-07-26 ENCOUNTER — TELEPHONE (OUTPATIENT)
Dept: PSYCHIATRY | Facility: CLINIC | Age: 31
End: 2021-07-26

## 2021-07-26 NOTE — TELEPHONE ENCOUNTER
Patient contacted because she just started Paxil from previous visit, she is also experiencing some side effects: no appetite - lost weight, eating very little  Patient is looking to stop taking the medication  Pharmacist told her to taper by taking half  Patient would like to discuss new medication at [phone]

## 2021-07-26 NOTE — TELEPHONE ENCOUNTER
Pt called with concerns about decreased appetite and weight loss with Paxil  Plan to wean off a follow up in 3 weeks

## 2021-07-26 NOTE — TELEPHONE ENCOUNTER
Whit Mixon called and LM on nursing line  She said since she started the Paxil approximately 10 days ago, she has had no appetite and has lost some weight  She would like Dr Janis Rodriguez to know and advise       Whit Mixon- [phone]

## 2021-07-27 ENCOUNTER — TELEPHONE (OUTPATIENT)
Dept: PSYCHIATRY | Facility: CLINIC | Age: 31
End: 2021-07-27

## 2021-07-27 NOTE — TELEPHONE ENCOUNTER
Naheed BILL with nursing: she missed the call from Dr Javier Peralta, but wanted to discuss her medication concerns

## 2021-07-27 NOTE — TELEPHONE ENCOUNTER
7/27 @ 3:05 Patient call requesting a call back to schedule asap appointment due to medication concerns    [phone]

## 2021-07-28 ENCOUNTER — TELEPHONE (OUTPATIENT)
Dept: PSYCHIATRY | Facility: CLINIC | Age: 31
End: 2021-07-28

## 2021-07-28 NOTE — TELEPHONE ENCOUNTER
----- Message from Aleksander Ashton sent at 7/27/2021  3:17 PM EDT -----  Regarding: Needs ASAP appointment  Patient l/m  requesting a call back to schedule asap appointment due to medication concerns    Please call [phone]

## 2021-07-28 NOTE — TELEPHONE ENCOUNTER
Spoke with pt  She has questions regarding her medication  Dr Janis Rodrigeuz is out sick today  Call transferred to nursing

## 2021-07-28 NOTE — TELEPHONE ENCOUNTER
Kaitlin Larsen stated she has an "urgent" question regarding medication  She is aware Dr Emma Sanchez is out today  She prefers a doctor help her with this   [phone]

## 2021-07-28 NOTE — TELEPHONE ENCOUNTER
Nursing called and spoke with [facility]  She reports she has gone "back and forth" with the Paxil  The last 3 days she has been taking the 5 mg  She said she is "thinking she wants to go back on it"     She has a few questions for Dr Niles Haney:     1- Will my fear and claustrophobia be solved after the temporary 6 months on Paxil  2- Will the fear and claustrophobia occur again later in life? Will she have to take Paxil again? Her phone then was cutting in and out  Nursing attempted to reach [facility] again two times without success  [facility] is aware Dr Niles Haney is out of the office today       [facility]- [phone]

## 2021-07-28 NOTE — TELEPHONE ENCOUNTER
As per Dr Emma Sanchez on 7/27/21    [Yesterday 5:12 PM] Pearl Collet tried calling her   left her a voicemail    [Yesterday 5:13 PM] Pearl Collet spoke to her yesterday about stopping the medication, she did not want to be on it anymore  So I was confused as to why she called back today

## 2021-07-29 NOTE — TELEPHONE ENCOUNTER
Called patient back and she will stay on 5 mg for 2 weeks and then increase to 10 mg  Concerned about lack of appetite and weight loss

## 2021-08-02 ENCOUNTER — TELEPHONE (OUTPATIENT)
Dept: PSYCHIATRY | Facility: CLINIC | Age: 31
End: 2021-08-02

## 2021-08-02 NOTE — TELEPHONE ENCOUNTER
8/2 @ 12:30  Naheed left voice mail requesting a call back  She is looking for advice  I returned call to get a little more information before sending message to provider

## 2021-08-02 NOTE — TELEPHONE ENCOUNTER
8/2 @ 2:00  Patient requested to speak with provider  Did not leave any information as to why  I reached out to patient but have not heard back  Will send message to provider to reach out    [phone]

## 2021-08-06 ENCOUNTER — TELEPHONE (OUTPATIENT)
Dept: PSYCHIATRY | Facility: CLINIC | Age: 31
End: 2021-08-06

## 2021-08-06 NOTE — TELEPHONE ENCOUNTER
When calling to confirm, Kaitlindillon Larsen asked if it would be too soon to see her since she just recently went back to 1mg of her prescription (did not say which)  Pt also asked if acupuncture would interfere with any of her medications she is taking  Please advise

## 2021-08-11 ENCOUNTER — OFFICE VISIT (OUTPATIENT)
Dept: PSYCHIATRY | Facility: CLINIC | Age: 31
End: 2021-08-11
Payer: COMMERCIAL

## 2021-08-11 DIAGNOSIS — F41.1 GENERALIZED ANXIETY DISORDER: Primary | ICD-10-CM

## 2021-08-11 DIAGNOSIS — F33.9 DEPRESSION, RECURRENT (HCC): ICD-10-CM

## 2021-08-11 DIAGNOSIS — F40.01 PANIC DISORDER WITH AGORAPHOBIA: ICD-10-CM

## 2021-08-11 PROCEDURE — 90833 PSYTX W PT W E/M 30 MIN: CPT | Performed by: PSYCHIATRY & NEUROLOGY

## 2021-08-11 PROCEDURE — 99214 OFFICE O/P EST MOD 30 MIN: CPT | Performed by: PSYCHIATRY & NEUROLOGY

## 2021-08-11 RX ORDER — PAROXETINE 10 MG/1
10 TABLET, FILM COATED ORAL DAILY
Qty: 30 TABLET | Refills: 2 | Status: SHIPPED | OUTPATIENT
Start: 2021-08-11 | End: 2021-10-04 | Stop reason: SDUPTHER

## 2021-08-11 NOTE — PSYCH
Psychiatric Medication Management - [address] 32 y o  female MRN: [id number]    Reason for Visit: No chief complaint on file  Subjective: The pt reports compliance wit her medications  One of the pts 6 yr old student  yesterday from stomach cancer, which caused her to be tearful yesterday, but overall she reports a more positive mood and states "I feel like I'm wearing a new lens " She reports improved appetite with good sleep without nightmares  She states she is more focused on enjoying her life and doing things for herself and admits to decreased racing thoughts and preoccupation with things out of her control  She continues to struggle with finding friends her age to travel with, but reports that she started going to the gym with a new friend and she's been talking to a [place] byron and is hopeful something can come out of it, although she is not focused on it  She denies any suicidal ideations, intent or plan           Review Of Systems:     Constitutional Negative   ENT Negative   Cardiovascular Negative   Respiratory Negative   Gastrointestinal Negative   Genitourinary Negative   Musculoskeletal Negative   Integumentary Negative   Neurological Negative   Endocrine Negative     Past Medical History:   Patient Active Problem List   Diagnosis    Acne vulgaris    Allergic rhinitis due to allergen    Spider veins of both lower extremities    Other viral warts    Panic disorder with agoraphobia    Irritable bowel syndrome with diarrhea    Vitamin D deficiency    Immunization refused    Irregular menses    Nonintractable headache    Generalized anxiety disorder    Encounter for routine adult physical exam with abnormal findings       Allergies: No Known Allergies    Past Surgical History:   Past Surgical History:   Procedure Laterality Date    LASIK Bilateral 2019    WISDOM TOOTH EXTRACTION         Family Psychiatric History:   Mom: depression, suicide attempt (2007)  Sister: suicide attempt         Past Psychiatric History:      Past Inpatient Psychiatric Treatment:   No history of past inpatient psychiatric admissions  Past Outpatient Psychiatric Treatment:    No history of past outpatient psychiatric treatment  Currently in therapy with Nguyễn Dey at [place] from [facility] since January  Past Suicide Attempts: no  Past Violent Behavior: no  Past Psychiatric Medication Trials: Xanax           Substance Abuse History:     No history of ETOH, illict substance, or tobacco abuse  No past legal actions or arrests secondary to substance intoxication  The patient denies prior DWIs/DUIs  No history of outpatient/inpatient rehabilitation programs  Austyn Yao does not exhibit objective evidence of substance withdrawal during today's examination nor does Austyn Yao appear under the influence of any psychoactive substance           Social History:     Developmental: Denies a history of milestone/developmental delay  Denies a history of in-utero exposure to toxins/illicit substances  There is no documented history of IEP or need for special education  Migrated from [place] at age 13  Education: Masters in Education  Marital history: single  Living arrangement, social support: parents, strained relationship with sister  Occupational History:  [employer], [employer] on the side  Access to firearms: Denies direct access to weapons/firearms  Glory Acevedo has no history of arrests or violence with a deadly weapon  Hx of  service: denies  Prior incarcerations or legal issues: denies     Traumatic History:      Abuse:none is reported  Other Traumatic Events: Mother's suicide attempt after father's affair  Ex-boyfriend cheating on her after 3 years in 2011      The following portions of the patient's history were reviewed and updated as appropriate: allergies, current medications, past family history, past medical history, past social history, past surgical history and problem list     Objective: There were no vitals filed for this visit  Weight (last 2 days)     None          Mental status:  Appearance sitting comfortably in chair, adequate hygiene and grooming, cooperative with interview, good eye contact   Mood "great"   Affect Appears generally euthymic, stable, mood-congruent   Speech Normal rate, rhythm, and volume   Thought Processes Linear and goal directed   Associations intact associations   Hallucinations Denies any auditory or visual hallucinations   Thought Content No passive or active suicidal or homicidal ideation, intent, or plan  Orientation Oriented to person, place, time, and situation   Recent and Remote Memory Grossly intact   Attention Span and Concentration Concentration intact   Intellect Appears to be of Average Intelligence   Insight Insight intact   Judgement judgment was intact   Muscle Strength Muscle strength and tone were normal   Language Within normal limits   Fund of Knowledge Age appropriate   Pain None     PHQ-A Depression Screening                   Assessment/Plan:       Diagnoses and all orders for this visit:    Generalized anxiety disorder    Panic disorder with agoraphobia          Diagnosis:      Treatment Recommendations:    · Cotninue Paxil 10mg QD for Panic disorder and DES  · Plan for Xanax 1mg PRN when flying on short flights  (Plan for Klonopin for long flights (ex [place]) in the future)  · Medical- F/u with primary care provider for on-going medical care  · Continue out-patient psychotherapy  · Follow-up with this provider in 4 weeks      Risks, Benefits And Possible Side Effects Of Medications:  Risks, benefits, and possible side effects of medications explained to patient and family, they verbalize understanding    Controlled Medication Discussion: Discussed with patient Black Box warning on concurrent use of benzodiazepines and opioid medications including sedation, respiratory depression, coma and death  Patient understands the risk of treatment with benzodiazepines in addition to opioids and wants to continue taking those medications  Psychotherapy Provided: Supportive psychotherapy provided  Counseling was provided during the session today for 20 minutes

## 2021-09-10 ENCOUNTER — TELEPHONE (OUTPATIENT)
Dept: PSYCHIATRY | Facility: CLINIC | Age: 31
End: 2021-09-10

## 2021-09-15 ENCOUNTER — TELEPHONE (OUTPATIENT)
Dept: PSYCHIATRY | Facility: CLINIC | Age: 31
End: 2021-09-15

## 2021-09-15 NOTE — TELEPHONE ENCOUNTER
Patient called and left message to r/s appt that was missed  Called back and r/s to 09/16 at 4 for a virtual visit

## 2021-09-16 ENCOUNTER — TELEPHONE (OUTPATIENT)
Dept: PSYCHIATRY | Facility: CLINIC | Age: 31
End: 2021-09-16

## 2021-09-16 ENCOUNTER — TELEMEDICINE (OUTPATIENT)
Dept: PSYCHIATRY | Facility: CLINIC | Age: 31
End: 2021-09-16
Payer: COMMERCIAL

## 2021-09-16 DIAGNOSIS — F41.1 GENERALIZED ANXIETY DISORDER: Primary | ICD-10-CM

## 2021-09-16 DIAGNOSIS — F40.01 PANIC DISORDER WITH AGORAPHOBIA: ICD-10-CM

## 2021-09-16 PROCEDURE — 99214 OFFICE O/P EST MOD 30 MIN: CPT | Performed by: PSYCHIATRY & NEUROLOGY

## 2021-09-16 NOTE — TELEPHONE ENCOUNTER
Called patient to ask if they wanted to make a 4 week f/u appt  She stated that she will call back to schedule

## 2021-09-16 NOTE — PSYCH
Psychiatric Medication Management - [address] 32 y o  female MRN: [id number]    Reason for Visit: medication management    Subjective:  Pt reports compliance with her medications and denies any current side effects  She reports going on a trip to [place] and [address] [place] [address] last weekend with some friends and states she had a great time  She reports a "more relaxed and easy going" mood, with no longer feeling overwhelmed  She reports with improved sleep, focus and appetite  She reports some recent weight gain, but attributes that to change in diet while on vacation  She reports lower energy, since school restarted, but attributes that the getting re-acclamated to working 8 hr days, after being off for a while  She states she met a byron from [facility]) on a dating ilya [place]), and they've been talking a lot for the past month and really likes him  They are planning on meeting in person in 2 weeks and she states she is hopeful that it may turn into a relationship  She denies any suicidal ideations, intent or plan          Review Of Systems:     Constitutional Negative   ENT Negative   Cardiovascular Negative   Respiratory Negative   Gastrointestinal Negative   Genitourinary Negative   Musculoskeletal Negative   Integumentary Negative   Neurological Negative   Endocrine Negative     Past Medical History:   Patient Active Problem List   Diagnosis    Acne vulgaris    Allergic rhinitis due to allergen    Spider veins of both lower extremities    Other viral warts    Panic disorder with agoraphobia    Irritable bowel syndrome with diarrhea    Vitamin D deficiency    Immunization refused    Irregular menses    Nonintractable headache    Generalized anxiety disorder    Encounter for routine adult physical exam with abnormal findings    Depression, recurrent (HCC)       Allergies: No Known Allergies    Past Surgical History:   Past Surgical History:   Procedure Laterality Date    LASIK Bilateral 01/19/2019    WISDOM TOOTH EXTRACTION         Family Psychiatric History:   Mom: depression, suicide attempt (2007)  Sister: suicide attempt         Past Psychiatric History:      Past Inpatient Psychiatric Treatment:   No history of past inpatient psychiatric admissions  Past Outpatient Psychiatric Treatment:    No history of past outpatient psychiatric treatment  Currently in therapy with Sravani Joel at [place] from [facility] since January  Past Suicide Attempts: no  Past Violent Behavior: no  Past Psychiatric Medication Trials: Xanax           Substance Abuse History:     No history of ETOH, illict substance, or tobacco abuse  No past legal actions or arrests secondary to substance intoxication  The patient denies prior DWIs/DUIs  No history of outpatient/inpatient rehabilitation programs  Naheed does not exhibit objective evidence of substance withdrawal during today's examination nor does Naheed appear under the influence of any psychoactive substance           Social History:     Developmental: Denies a history of milestone/developmental delay  Denies a history of in-utero exposure to toxins/illicit substances  There is no documented history of IEP or need for special education  Migrated from [place] at age 13  Education: Masters in Education  Marital history: single  Living arrangement, social support: parents, strained relationship with sister  Occupational History:  [employer], [id number] [address] on the side  Access to firearms: Denies direct access to weapons/firearms  Naheed Acevedo has no history of arrests or violence with a deadly weapon  Hx of  service: denies  Prior incarcerations or legal issues: denies     Traumatic History:      Abuse:none is reported  Other Traumatic Events: Mother's suicide attempt after father's affair  Ex-boyfriend cheating on her after 3 years in 2011      The following portions of the patient's history were reviewed and updated as appropriate: allergies, current medications, past family history, past medical history, past social history, past surgical history and problem list     Objective: There were no vitals filed for this visit  Weight (last 2 days)     None          Mental status:  Appearance sitting comfortably in chair, dressed in casual clothing, adequate hygiene and grooming, cooperative with interview, good eye contact   Mood "relaxed"   Affect Appears generally euthymic, stable, mood-congruent   Speech Normal rate, rhythm, and volume   Thought Processes Linear and goal directed   Associations intact associations   Hallucinations Denies any auditory or visual hallucinations   Thought Content No passive or active suicidal or homicidal ideation, intent, or plan  Orientation Oriented to person, place, time, and situation   Recent and Remote Memory Grossly intact   Attention Span and Concentration Concentration intact   Intellect Appears to be of Average Intelligence   Insight Insight intact   Judgement judgment was intact   Muscle Strength Muscle strength and tone were normal   Language Within normal limits   Fund of Knowledge Age appropriate   Pain None     PHQ-A Depression Screening    Feeling down, depressed, irritable or hopeless: 0 - not at all  Little interest or pleasure in doing things: 0 - not at all  Trouble falling or staying asleep, or sleeping too much: 0 - not at all  Poor appetite or overeatin - more than half the days  Feeling tired or having little energy: 3 - nearly every day  Feeling bad about yourself - or that you are a failure or have let yourself or your family down: 0 - not at all  Trouble concentrating on things, such as reading the newspaper or watching television: 0 - not at all  Moving or speaking so slowly that other people could have noticed   Or the opposite - being so fidgety or restless that you have been moving around a lot more than usual: 0 - not at all Assessment/Plan:       Diagnoses and all orders for this visit:    Generalized anxiety disorder    Panic disorder with agoraphobia          Diagnosis:      Treatment Recommendations:    · Cotninue Paxil 10mg QD for Panic disorder and DES  · Plan for Xanax 1mg PRN when flying on short flights  (Plan for Klonopin for long flights (ex [place]) in the future)  · Medical- F/u with primary care provider for on-going medical care  · Continue out-patient psychotherapy  · Follow-up with this provider in 4 weeks  · Monitor weight (as pt reported some recent weight gain)  Risks, Benefits And Possible Side Effects Of Medications:  Risks, benefits, and possible side effects of medications explained to patient and family, they verbalize understanding    Controlled Medication Discussion: Discussed with patient Black Box warning on concurrent use of benzodiazepines and opioid medications including sedation, respiratory depression, coma and death  Patient understands the risk of treatment with benzodiazepines in addition to opioids and wants to continue taking those medications  Psychotherapy Provided: Supportive psychotherapy provided  Yes  Counseling was provided during the session today for 16 minutes

## 2021-09-17 ENCOUNTER — OFFICE VISIT (OUTPATIENT)
Dept: FAMILY MEDICINE CLINIC | Facility: CLINIC | Age: 31
End: 2021-09-17
Payer: COMMERCIAL

## 2021-09-17 VITALS
RESPIRATION RATE: 18 BRPM | WEIGHT: 137 LBS | TEMPERATURE: 96.9 F | OXYGEN SATURATION: 100 % | HEART RATE: 93 BPM | BODY MASS INDEX: 23.39 KG/M2 | DIASTOLIC BLOOD PRESSURE: 72 MMHG | SYSTOLIC BLOOD PRESSURE: 112 MMHG | HEIGHT: 64 IN

## 2021-09-17 DIAGNOSIS — F33.9 DEPRESSION, RECURRENT (HCC): ICD-10-CM

## 2021-09-17 DIAGNOSIS — I88.9 LYMPHADENITIS: ICD-10-CM

## 2021-09-17 DIAGNOSIS — R53.83 OTHER FATIGUE: Primary | ICD-10-CM

## 2021-09-17 PROCEDURE — 99214 OFFICE O/P EST MOD 30 MIN: CPT | Performed by: FAMILY MEDICINE

## 2021-09-17 PROCEDURE — 3008F BODY MASS INDEX DOCD: CPT | Performed by: FAMILY MEDICINE

## 2021-09-17 PROCEDURE — 1036F TOBACCO NON-USER: CPT | Performed by: FAMILY MEDICINE

## 2021-09-17 RX ORDER — CEPHALEXIN 500 MG/1
500 CAPSULE ORAL EVERY 8 HOURS SCHEDULED
Qty: 21 CAPSULE | Refills: 0 | Status: SHIPPED | OUTPATIENT
Start: 2021-09-17 | End: 2021-09-24

## 2021-09-17 NOTE — PROGRESS NOTES
Subjective:   Chief Complaint   Patient presents with    Mass     bilateral axilla        Patient ID: Kait Haile is a 32 y o  female  The patient had a concerned about the fatigue it has been going on and off for almost 6 months deny any headache blurred vision no double vision no numbness no rash no muscle weakness no joint swelling and no fever no weight change no tick bite also patient concern about the bilateral axillary area palpable lump it is painful and it no weight change in no fever a no no palpable lump in the breast area and no nipple discharge a she notice it after she did the shaving and bilateral axillary      The following portions of the patient's history were reviewed and updated as appropriate: allergies, current medications, past family history, past medical history, past social history, past surgical history and problem list     Review of Systems   Constitutional: Positive for fatigue  Negative for activity change, appetite change and fever  HENT: Negative for congestion, ear pain, sinus pressure, sinus pain and sore throat  Eyes: Negative for pain, discharge, redness and itching  Respiratory: Negative for cough, chest tightness, shortness of breath and stridor  Cardiovascular: Negative for chest pain, palpitations and leg swelling  Gastrointestinal: Negative for abdominal pain, blood in stool, constipation, diarrhea and nausea  Genitourinary: Negative for dysuria, flank pain, frequency and hematuria  Musculoskeletal: Negative for back pain, joint swelling and neck pain  Skin: Negative for pallor and rash  Neurological: Negative for dizziness, tremors, weakness, numbness and headaches  Hematological: Does not bruise/bleed easily               Objective:  Vitals:    09/17/21 1059   BP: 112/72   BP Location: Left arm   Patient Position: Sitting   Cuff Size: Standard   Pulse: 93   Resp: 18   Temp: (!) 96 9 °F (36 1 °C)   SpO2: 100%   Weight: 62 1 kg (137 lb) Height: 5' 3 5" (1 613 m)      Physical Exam  Vitals and nursing note reviewed  Constitutional:       General: She is not in acute distress  Appearance: She is well-developed  She is not diaphoretic  HENT:      Head: Normocephalic  Right Ear: Tympanic membrane, ear canal and external ear normal       Left Ear: Tympanic membrane, ear canal and external ear normal       Nose: Nose normal  No congestion or rhinorrhea  Mouth/Throat:      Mouth: Mucous membranes are moist       Pharynx: Oropharynx is clear  No oropharyngeal exudate or posterior oropharyngeal erythema  Eyes:      General:         Right eye: No discharge  Left eye: No discharge  Conjunctiva/sclera: Conjunctivae normal       Pupils: Pupils are equal, round, and reactive to light  Neck:      Vascular: No JVD  Cardiovascular:      Rate and Rhythm: Normal rate and regular rhythm  Heart sounds: Normal heart sounds  No murmur heard  No gallop  Pulmonary:      Effort: Pulmonary effort is normal  No respiratory distress  Breath sounds: Normal breath sounds  No stridor  No wheezing or rales  Chest:      Chest wall: No tenderness  Abdominal:      General: There is no distension  Palpations: Abdomen is soft  There is no mass  Tenderness: There is no abdominal tenderness  There is no rebound  Musculoskeletal:         General: No tenderness  Cervical back: Normal range of motion and neck supple  Lymphadenopathy:      Cervical: No cervical adenopathy  Upper Body:      Right upper body: Axillary adenopathy present  Left upper body: Axillary adenopathy present  Skin:     General: Skin is warm  Findings: No erythema or rash  Neurological:      Mental Status: She is alert and oriented to person, place, and time  Motor: No weakness        Gait: Gait normal            Assessment/Plan:    Other fatigue  New diagnosis symptomatic discuss with patient it can be multi factorial Discuss sleep hygiene ,well hydration  Plan to R/O anemia ,and thyroid problem R/O lyme disease    Depression, recurrent (HCC)  Chronic symptomatic f/up with Psychiatric currently on Paxil 10 mg po/day proper use and possible side effect discuss with patient    Lymphadenitis  New diagnosis symptomatic start Keflex 500 mg po TID for 7 days proper use and possible side effect discuss with patient   Plan for US of Axillary area       Diagnoses and all orders for this visit:    Other fatigue  -     CBC and differential; Future  -     Comprehensive metabolic panel; Future  -     Lipid panel; Future  -     TSH, 3rd generation with Free T4 reflex; Future  -     Vitamin B12; Future  -     PAWEL Screen w/ Reflex to Titer/Pattern; Future  -     Lyme Total Antibody Profile with reflex to WB; Future  -     Vitamin D 25 hydroxy; Future    Lymphadenitis  -     cephalexin (KEFLEX) 500 mg capsule; Take 1 capsule (500 mg total) by mouth every 8 (eight) hours for 7 days  -     CBC and differential; Future  -     Comprehensive metabolic panel; Future  -     Lipid panel; Future  -     TSH, 3rd generation with Free T4 reflex; Future  -     Vitamin B12; Future  -     PAWEL Screen w/ Reflex to Titer/Pattern; Future  -     Lyme Total Antibody Profile with reflex to WB; Future  -     Vitamin D 25 hydroxy; Future  -     US axilla;  Future    Depression, recurrent ([address] )

## 2021-09-18 ENCOUNTER — NURSE TRIAGE (OUTPATIENT)
Dept: OTHER | Facility: OTHER | Age: 31
End: 2021-09-18

## 2021-09-18 ENCOUNTER — APPOINTMENT (OUTPATIENT)
Dept: LAB | Facility: HOSPITAL | Age: 31
End: 2021-09-18
Payer: COMMERCIAL

## 2021-09-18 DIAGNOSIS — Z20.822 EXPOSURE TO COVID-19 VIRUS: Primary | ICD-10-CM

## 2021-09-18 DIAGNOSIS — R53.83 OTHER FATIGUE: ICD-10-CM

## 2021-09-18 DIAGNOSIS — I88.9 LYMPHADENITIS: ICD-10-CM

## 2021-09-18 LAB
25(OH)D3 SERPL-MCNC: 29.8 NG/ML (ref 30–100)
ALBUMIN SERPL BCP-MCNC: 3.7 G/DL (ref 3.5–5)
ALP SERPL-CCNC: 77 U/L (ref 46–116)
ALT SERPL W P-5'-P-CCNC: 27 U/L (ref 12–78)
ANION GAP SERPL CALCULATED.3IONS-SCNC: 3 MMOL/L (ref 4–13)
AST SERPL W P-5'-P-CCNC: 16 U/L (ref 5–45)
BASOPHILS # BLD AUTO: 0.02 THOUSANDS/ΜL (ref 0–0.1)
BASOPHILS NFR BLD AUTO: 0 % (ref 0–1)
BILIRUB SERPL-MCNC: 0.46 MG/DL (ref 0.2–1)
BUN SERPL-MCNC: 19 MG/DL (ref 5–25)
CALCIUM SERPL-MCNC: 8.9 MG/DL (ref 8.3–10.1)
CHLORIDE SERPL-SCNC: 103 MMOL/L (ref 100–108)
CHOLEST SERPL-MCNC: 215 MG/DL (ref 50–200)
CO2 SERPL-SCNC: 30 MMOL/L (ref 21–32)
CREAT SERPL-MCNC: 0.62 MG/DL (ref 0.6–1.3)
EOSINOPHIL # BLD AUTO: 0.19 THOUSAND/ΜL (ref 0–0.61)
EOSINOPHIL NFR BLD AUTO: 3 % (ref 0–6)
ERYTHROCYTE [DISTWIDTH] IN BLOOD BY AUTOMATED COUNT: 12.8 % (ref 11.6–15.1)
GFR SERPL CREATININE-BSD FRML MDRD: 121 ML/MIN/1.73SQ M
GLUCOSE P FAST SERPL-MCNC: 82 MG/DL (ref 65–99)
HCT VFR BLD AUTO: 40.6 % (ref 34.8–46.1)
HDLC SERPL-MCNC: 74 MG/DL
HGB BLD-MCNC: 13.3 G/DL (ref 11.5–15.4)
IMM GRANULOCYTES # BLD AUTO: 0.01 THOUSAND/UL (ref 0–0.2)
IMM GRANULOCYTES NFR BLD AUTO: 0 % (ref 0–2)
LDLC SERPL CALC-MCNC: 128 MG/DL (ref 0–100)
LYMPHOCYTES # BLD AUTO: 1.36 THOUSANDS/ΜL (ref 0.6–4.47)
LYMPHOCYTES NFR BLD AUTO: 24 % (ref 14–44)
MCH RBC QN AUTO: 30.2 PG (ref 26.8–34.3)
MCHC RBC AUTO-ENTMCNC: 32.8 G/DL (ref 31.4–37.4)
MCV RBC AUTO: 92 FL (ref 82–98)
MONOCYTES # BLD AUTO: 0.53 THOUSAND/ΜL (ref 0.17–1.22)
MONOCYTES NFR BLD AUTO: 9 % (ref 4–12)
NEUTROPHILS # BLD AUTO: 3.61 THOUSANDS/ΜL (ref 1.85–7.62)
NEUTS SEG NFR BLD AUTO: 64 % (ref 43–75)
NONHDLC SERPL-MCNC: 141 MG/DL
NRBC BLD AUTO-RTO: 0 /100 WBCS
PLATELET # BLD AUTO: 236 THOUSANDS/UL (ref 149–390)
PMV BLD AUTO: 9.9 FL (ref 8.9–12.7)
POTASSIUM SERPL-SCNC: 4 MMOL/L (ref 3.5–5.3)
PROT SERPL-MCNC: 7.3 G/DL (ref 6.4–8.2)
RBC # BLD AUTO: 4.4 MILLION/UL (ref 3.81–5.12)
SODIUM SERPL-SCNC: 136 MMOL/L (ref 136–145)
TRIGL SERPL-MCNC: 66 MG/DL
TSH SERPL DL<=0.05 MIU/L-ACNC: 1.56 UIU/ML (ref 0.36–3.74)
VIT B12 SERPL-MCNC: 871 PG/ML (ref 100–900)
WBC # BLD AUTO: 5.72 THOUSAND/UL (ref 4.31–10.16)

## 2021-09-18 PROCEDURE — 80053 COMPREHEN METABOLIC PANEL: CPT

## 2021-09-18 PROCEDURE — 82306 VITAMIN D 25 HYDROXY: CPT

## 2021-09-18 PROCEDURE — 80061 LIPID PANEL: CPT

## 2021-09-18 PROCEDURE — 86617 LYME DISEASE ANTIBODY: CPT

## 2021-09-18 PROCEDURE — 85025 COMPLETE CBC W/AUTO DIFF WBC: CPT

## 2021-09-18 PROCEDURE — 86038 ANTINUCLEAR ANTIBODIES: CPT

## 2021-09-18 PROCEDURE — 86618 LYME DISEASE ANTIBODY: CPT

## 2021-09-18 PROCEDURE — 82607 VITAMIN B-12: CPT

## 2021-09-18 PROCEDURE — 36415 COLL VENOUS BLD VENIPUNCTURE: CPT

## 2021-09-18 PROCEDURE — 84443 ASSAY THYROID STIM HORMONE: CPT

## 2021-09-18 NOTE — TELEPHONE ENCOUNTER
Reason for Disposition   [1] COVID-19 infection suspected by caller or triager AND [2] mild symptoms (cough, fever, or others) AND [7] no complications or SOB    Answer Assessment - Initial Assessment Questions  1  COVID-19 DIAGNOSIS: "Who made your Coronavirus (COVID-19) diagnosis?" "Was it confirmed by a positive lab test?" If not diagnosed by a HCP, ask "Are there lots of cases (community spread) where you live?" (See public health department website, if unsure)      Patient developed Covid 19 like symptoms 5 days ago   2  COVID-19 EXPOSURE: "Was there any known exposure to COVID before the symptoms began?" [facility] Definition of close contact: within 6 feet (2 meters) for a total of 15 minutes or more over a 24-hour period  Patient's student   3  ONSET: "When did the COVID-19 symptoms start?"       5 days ago   4  WORST SYMPTOM: "What is your worst symptom?" (e g , cough, fever, shortness of breath, muscle aches)      Diarrhea, body aches, fatigue, headache   5  COUGH: "Do you have a cough?" If Yes, ask: "How bad is the cough?"        No   6  FEVER: "Do you have a fever?" If Yes, ask: "What is your temperature, how was it measured, and when did it start?"      No   7  RESPIRATORY STATUS: "Describe your breathing?" (e g , shortness of breath, wheezing, unable to speak)       Normal breathing   8  BETTER-SAME-WORSE: "Are you getting better, staying the same or getting worse compared to yesterday?"  If getting worse, ask, "In what way?"       Same   9  HIGH RISK DISEASE: "Do you have any chronic medical problems?" (e g , asthma, heart or lung disease, weak immune system, obesity, etc )      No   10  PREGNANCY: "Is there any chance you are pregnant?" "When was your last menstrual period?"        No   11   OTHER SYMPTOMS: "Do you have any other symptoms?"  (e g , chills, fatigue, headache, loss of smell or taste, muscle pain, sore throat; new loss of smell or taste especially support the diagnosis of COVID-19) Fatigue, headache, muscle pain, runny nose      Protocols used: CORONAVIRUS (COVID-19) DIAGNOSED OR SUSPECTED-ADULT-

## 2021-09-20 PROBLEM — I88.9 LYMPHADENITIS: Status: ACTIVE | Noted: 2021-09-20

## 2021-09-20 PROBLEM — I88.9 LYMPHADENITIS: Status: ACTIVE | Noted: 2021-09-17

## 2021-09-20 LAB — RYE IGE QN: NEGATIVE

## 2021-09-21 ENCOUNTER — TELEPHONE (OUTPATIENT)
Dept: FAMILY MEDICINE CLINIC | Facility: CLINIC | Age: 31
End: 2021-09-21

## 2021-09-21 LAB — B BURGDOR IGG+IGM SER-ACNC: 135

## 2021-09-21 NOTE — ASSESSMENT & PLAN NOTE
New diagnosis symptomatic start Keflex 500 mg po TID for 7 days proper use and possible side effect discuss with patient   Plan for US of Axillary area

## 2021-09-21 NOTE — ASSESSMENT & PLAN NOTE
Chronic symptomatic f/up with Psychiatric currently on Paxil 10 mg po/day proper use and possible side effect discuss with patient unknown

## 2021-09-21 NOTE — ASSESSMENT & PLAN NOTE
New diagnosis symptomatic discuss with patient it can be multi factorial   Discuss sleep hygiene ,well hydration  Plan to R/O anemia ,and thyroid problem R/O lyme disease

## 2021-09-22 ENCOUNTER — TELEPHONE (OUTPATIENT)
Dept: FAMILY MEDICINE CLINIC | Facility: CLINIC | Age: 31
End: 2021-09-22

## 2021-09-22 LAB
B BURGDOR IGG PATRN SER IB-IMP: NEGATIVE
B BURGDOR IGM PATRN SER IB-IMP: NEGATIVE
B BURGDOR18KD IGG SER QL IB: NORMAL
B BURGDOR23KD IGG SER QL IB: NORMAL
B BURGDOR23KD IGM SER QL IB: NORMAL
B BURGDOR28KD IGG SER QL IB: NORMAL
B BURGDOR30KD IGG SER QL IB: NORMAL
B BURGDOR39KD IGG SER QL IB: NORMAL
B BURGDOR39KD IGM SER QL IB: NORMAL
B BURGDOR41KD IGG SER QL IB: NORMAL
B BURGDOR41KD IGM SER QL IB: NORMAL
B BURGDOR45KD IGG SER QL IB: NORMAL
B BURGDOR58KD IGG SER QL IB: NORMAL
B BURGDOR66KD IGG SER QL IB: NORMAL
B BURGDOR93KD IGG SER QL IB: NORMAL

## 2021-09-23 DIAGNOSIS — E55.9 VITAMIN D DEFICIENCY: ICD-10-CM

## 2021-09-23 RX ORDER — ACETAMINOPHEN 160 MG
TABLET,DISINTEGRATING ORAL
Qty: 90 CAPSULE | Refills: 0 | Status: SHIPPED | OUTPATIENT
Start: 2021-09-23 | End: 2022-01-10

## 2021-09-28 ENCOUNTER — HOSPITAL ENCOUNTER (OUTPATIENT)
Dept: ULTRASOUND IMAGING | Facility: CLINIC | Age: 31
Discharge: HOME | End: 2021-09-28
Payer: COMMERCIAL

## 2021-09-28 VITALS — BODY MASS INDEX: 24.1 KG/M2 | HEIGHT: 63 IN | WEIGHT: 136 LBS

## 2021-09-28 DIAGNOSIS — R59.1 LYMPHADENOPATHY: ICD-10-CM

## 2021-09-28 DIAGNOSIS — I88.9 LYMPHADENITIS: ICD-10-CM

## 2021-09-28 PROCEDURE — 76642 ULTRASOUND BREAST LIMITED: CPT

## 2021-09-29 ENCOUNTER — TELEPHONE (OUTPATIENT)
Dept: FAMILY MEDICINE CLINIC | Facility: CLINIC | Age: 31
End: 2021-09-29

## 2021-10-04 DIAGNOSIS — F40.01 PANIC DISORDER WITH AGORAPHOBIA: ICD-10-CM

## 2021-10-04 DIAGNOSIS — F41.1 GENERALIZED ANXIETY DISORDER: ICD-10-CM

## 2021-10-05 RX ORDER — PAROXETINE 10 MG/1
10 TABLET, FILM COATED ORAL DAILY
Qty: 90 TABLET | Refills: 0 | Status: SHIPPED | OUTPATIENT
Start: 2021-10-05 | End: 2022-01-04

## 2021-11-10 ENCOUNTER — TELEMEDICINE (OUTPATIENT)
Dept: FAMILY MEDICINE CLINIC | Facility: CLINIC | Age: 31
End: 2021-11-10
Payer: COMMERCIAL

## 2021-11-10 VITALS — OXYGEN SATURATION: 98 % | HEART RATE: 103 BPM

## 2021-11-10 DIAGNOSIS — J02.9 SORE THROAT: Primary | ICD-10-CM

## 2021-11-10 DIAGNOSIS — R09.81 NASAL CONGESTION: ICD-10-CM

## 2021-11-10 PROCEDURE — U0003 INFECTIOUS AGENT DETECTION BY NUCLEIC ACID (DNA OR RNA); SEVERE ACUTE RESPIRATORY SYNDROME CORONAVIRUS 2 (SARS-COV-2) (CORONAVIRUS DISEASE [COVID-19]), AMPLIFIED PROBE TECHNIQUE, MAKING USE OF HIGH THROUGHPUT TECHNOLOGIES AS DESCRIBED BY CMS-2020-01-R: HCPCS | Performed by: NURSE PRACTITIONER

## 2021-11-10 PROCEDURE — 87070 CULTURE OTHR SPECIMN AEROBIC: CPT | Performed by: NURSE PRACTITIONER

## 2021-11-10 PROCEDURE — 1036F TOBACCO NON-USER: CPT | Performed by: NURSE PRACTITIONER

## 2021-11-10 PROCEDURE — 99214 OFFICE O/P EST MOD 30 MIN: CPT | Performed by: NURSE PRACTITIONER

## 2021-11-10 PROCEDURE — U0005 INFEC AGEN DETEC AMPLI PROBE: HCPCS | Performed by: NURSE PRACTITIONER

## 2021-11-11 ENCOUNTER — TELEPHONE (OUTPATIENT)
Dept: FAMILY MEDICINE CLINIC | Facility: CLINIC | Age: 31
End: 2021-11-11

## 2021-11-11 LAB — SARS-COV-2 RNA RESP QL NAA+PROBE: NEGATIVE

## 2021-11-12 ENCOUNTER — TELEPHONE (OUTPATIENT)
Dept: FAMILY MEDICINE CLINIC | Facility: CLINIC | Age: 31
End: 2021-11-12

## 2021-11-12 LAB — BACTERIA THROAT CULT: NORMAL

## 2021-11-18 ENCOUNTER — OFFICE VISIT (OUTPATIENT)
Dept: PSYCHIATRY | Facility: CLINIC | Age: 31
End: 2021-11-18

## 2021-11-18 DIAGNOSIS — F41.1 GENERALIZED ANXIETY DISORDER: Primary | ICD-10-CM

## 2021-11-18 DIAGNOSIS — F40.01 PANIC DISORDER WITH AGORAPHOBIA: ICD-10-CM

## 2021-11-26 ENCOUNTER — TELEPHONE (OUTPATIENT)
Dept: PSYCHIATRY | Facility: CLINIC | Age: 31
End: 2021-11-26

## 2021-12-06 ENCOUNTER — TELEPHONE (OUTPATIENT)
Dept: PSYCHIATRY | Facility: CLINIC | Age: 31
End: 2021-12-06

## 2021-12-13 ENCOUNTER — TELEPHONE (OUTPATIENT)
Dept: PSYCHIATRY | Facility: CLINIC | Age: 31
End: 2021-12-13

## 2021-12-28 ENCOUNTER — TELEPHONE (OUTPATIENT)
Dept: PSYCHIATRY | Facility: CLINIC | Age: 31
End: 2021-12-28

## 2022-01-04 DIAGNOSIS — F40.01 PANIC DISORDER WITH AGORAPHOBIA: ICD-10-CM

## 2022-01-04 DIAGNOSIS — F41.1 GENERALIZED ANXIETY DISORDER: ICD-10-CM

## 2022-01-04 RX ORDER — PAROXETINE 10 MG/1
TABLET, FILM COATED ORAL
Qty: 90 TABLET | Refills: 0 | Status: SHIPPED | OUTPATIENT
Start: 2022-01-04 | End: 2022-04-25 | Stop reason: ALTCHOICE

## 2022-01-05 ENCOUNTER — TELEPHONE (OUTPATIENT)
Dept: PSYCHIATRY | Facility: CLINIC | Age: 32
End: 2022-01-05

## 2022-01-05 ENCOUNTER — TELEMEDICINE (OUTPATIENT)
Dept: FAMILY MEDICINE CLINIC | Facility: CLINIC | Age: 32
End: 2022-01-05
Payer: COMMERCIAL

## 2022-01-05 VITALS — TEMPERATURE: 97 F

## 2022-01-05 DIAGNOSIS — Z28.311 COVID-19 VACCINE SERIES NOT COMPLETED: ICD-10-CM

## 2022-01-05 DIAGNOSIS — Z20.822 EXPOSURE TO COVID-19 VIRUS: ICD-10-CM

## 2022-01-05 DIAGNOSIS — J06.9 VIRAL UPPER RESPIRATORY TRACT INFECTION: Primary | ICD-10-CM

## 2022-01-05 DIAGNOSIS — Z28.9 COVID-19 VACCINE SERIES NOT COMPLETED: ICD-10-CM

## 2022-01-05 PROCEDURE — 99214 OFFICE O/P EST MOD 30 MIN: CPT | Performed by: FAMILY MEDICINE

## 2022-01-05 PROCEDURE — 1036F TOBACCO NON-USER: CPT | Performed by: FAMILY MEDICINE

## 2022-01-05 PROCEDURE — U0003 INFECTIOUS AGENT DETECTION BY NUCLEIC ACID (DNA OR RNA); SEVERE ACUTE RESPIRATORY SYNDROME CORONAVIRUS 2 (SARS-COV-2) (CORONAVIRUS DISEASE [COVID-19]), AMPLIFIED PROBE TECHNIQUE, MAKING USE OF HIGH THROUGHPUT TECHNOLOGIES AS DESCRIBED BY CMS-2020-01-R: HCPCS | Performed by: FAMILY MEDICINE

## 2022-01-05 NOTE — TELEPHONE ENCOUNTER
Returned the patient's call  She declined to change to a vv as she would rather come into the office  She is r/s for next week

## 2022-01-05 NOTE — PROGRESS NOTES
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 the per patient she contact the a is someone who tested positive with the lost the 5 days and she would like to be tested we will order COVID swab today patient will come to the office the and recommend vitamin-C vitamin-D and zinc recommend patient to be in quarantine until we get the result of the test         Relevant Orders    COVID Only - Office Collect       Other    Exposure to COVID-19 virus     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 she is not the vaccine needed she would like to be tested she symptomatic          Relevant Orders    COVID Only - Office Collect    COVID-19 vaccine series not completed     discuss benefit of immunization              Disposition:     Recommended patient to come to the office to test for COVID-19  I have spent 15 minutes directly with the patient  Greater than 50% of this time was spent in counseling/coordination of care regarding: instructions for management and patient and family education  Encounter provider Celine Reina MD    Provider located at [facility] [address]  [address] [address] [address] [phone]    Recent Visits  Date Type Provider Dept   01/05/22 Mata Naranjo MD Pg  [facility]   Showing recent visits within past 7 days and meeting all other requirements  Future Appointments  No visits were found meeting these conditions    Showing future appointments within next 150 days and meeting all other requirements     This virtual check-in was done via [employer] and patient was informed that this is a secure, HIPAA-compliant platform  She agrees to proceed  Patient agrees to participate in a virtual check in via telephone or video visit instead of presenting to the office to address urgent/immediate medical needs  Patient is aware this is a billable service  After connecting through [facility], the patient was identified by name and date of birth  Michaela Velasquez was informed that this was a telemedicine visit and that the exam was being conducted confidentially over secure lines  My office door was closed  No one else was in the room  Michaela Velasquez acknowledged consent and understanding of privacy and security of the telemedicine visit  I informed the patient that I have reviewed her record in Epic and presented the opportunity for her to ask any questions regarding the visit today  The patient agreed to participate  Verification of patient location:  Patient is located in the following state in which I hold an active license: [place]    Subjective:   Michaela Velasquez is a 32 y o  female who is concerned about COVID-19  Patient's symptoms include fatigue, malaise, anosmia, loss of taste and headache  Patient denies fever, chills, congestion, rhinorrhea, sore throat, cough, shortness of breath, chest tightness, abdominal pain, nausea, vomiting, diarrhea and myalgias         Date of symptom onset: 1/4/2022  COVID-19 vaccination status: Not vaccinated    Exposure:   Contact with a person who is under investigation (PUI) for or who is positive for COVID-19 within the last 14 days?: Yes    Hospitalized recently for fever and/or lower respiratory symptoms?: No      Currently a healthcare worker that is involved in direct patient care?: No      Works in a special setting where the risk of COVID-19 transmission may be high? (this may include long-term care, correctional and senior care facilities; homeless shelters; assisted-living facilities and group homes ): No      Resident in a special setting where the risk of COVID-19 transmission may be high? (this may include long-term care, correctional and halfway facilities; homeless shelters; assisted-living facilities and group homes ): No      Lab Results   Component Value Date    SARSCOV2 Negative 11/10/2021    SARSCOV2 Not Detected 11/12/2020     Past Medical History:   Diagnosis Date    Chickenpox     COVID-19 virus infection 3/23/2021    Depression 7/7/2021    Encounter for well adult exam with abnormal findings 6/5/2020    Exposure to COVID-19 virus 11/12/2020    HPV vaccine counseling     never had    Lump 9/12/2019    Other fatigue 6/5/2020    Panic attack 6/17/2019    Positive depression screening 2/5/2019    Scalp cyst 11/22/2019    removed     Past Surgical History:   Procedure Laterality Date    LASIK Bilateral 01/19/2019    WISDOM TOOTH EXTRACTION       Current Outpatient Medications   Medication Sig Dispense Refill    Cholecalciferol (Vitamin D3) 50 MCG (2000 UT) capsule TAKE 1 CAPSULE BY MOUTH EVERY DAY 90 capsule 0    PARoxetine (PAXIL) 10 mg tablet TAKE 1 TABLET BY MOUTH EVERY DAY 90 tablet 0    ALPRAZolam (XANAX) 1 mg tablet Take 1 tablet 1 hour prior to flight (Patient not taking: Reported on 9/17/2021) 10 tablet 0    Garlic 200 MG TABS Take by mouth (Patient not taking: Reported on 1/5/2022 )      Multiple Vitamin (multivitamin) tablet Take 1 tablet by mouth daily (Patient not taking: Reported on 1/5/2022 )       No current facility-administered medications for this visit  No Known Allergies    Review of Systems   Constitutional: Positive for fatigue  Negative for activity change, appetite change, chills and fever  HENT: Negative for congestion, ear pain, rhinorrhea, sinus pressure, sinus pain and sore throat  Eyes: Negative for pain, discharge, redness and itching  Respiratory: Negative for cough, chest tightness, shortness of breath and stridor  Cardiovascular: Negative for chest pain, palpitations and leg swelling  Gastrointestinal: Negative for abdominal pain, blood in stool, constipation, diarrhea, nausea and vomiting  Genitourinary: Negative for dysuria, flank pain, frequency and hematuria  Musculoskeletal: Negative for back pain, joint swelling, myalgias and neck pain  Skin: Negative for pallor and rash  Neurological: Positive for headaches  Negative for dizziness, tremors, weakness and numbness  Hematological: Does not bruise/bleed easily  Objective:    Vitals:    01/05/22 1215   Temp: (!) 97 °F (36 1 °C)       Physical Exam  Constitutional:       General: She is not in acute distress  Appearance: She is well-developed  She is not ill-appearing, toxic-appearing or diaphoretic  HENT:      Head: Normocephalic  Nose: Nose normal  No congestion or rhinorrhea  Mouth/Throat:      Mouth: Mucous membranes are moist       Pharynx: No oropharyngeal exudate or posterior oropharyngeal erythema  Eyes:      General:         Right eye: No discharge  Left eye: No discharge  Conjunctiva/sclera: Conjunctivae normal    Neck:      Vascular: No JVD  Comments: No grossly visible mass   Pulmonary:      Effort: Pulmonary effort is normal  No respiratory distress  Breath sounds: No stridor  No wheezing  Abdominal:      General: There is no distension  Comments: Patient state her abdomen and a soft she also state no tender  Patient deny any visible or palpable bulging to suggest hernia  I was able to visualize abdomen and there is no change in the color no distension no visible mass   Musculoskeletal:         General: Normal range of motion  Cervical back: Normal range of motion and neck supple  Skin:     Findings: No erythema or rash  Neurological:      Mental Status: She is alert and oriented to person, place, and time     Psychiatric:         Mood and Affect: Mood normal          VIRTUAL VISIT DISCLAIMER    Venkatesh Zeniaks verbally agrees to participate in Virtual Care Services  Pt is aware that [employer] could be limited without vital signs or the ability to perform a full hands-on physical Edgardo Erica understands she or the provider may request at any time to terminate the video visit and request the patient to seek care or treatment in person

## 2022-01-05 NOTE — TELEPHONE ENCOUNTER
Pt LVM requesting call back to r/s appointment today @4  Patient isn't feeling well and would like to r/s

## 2022-01-06 PROBLEM — Z28.311 COVID-19 VACCINE SERIES NOT COMPLETED: Status: ACTIVE | Noted: 2022-01-06

## 2022-01-06 PROBLEM — Z28.9 COVID-19 VACCINE SERIES NOT COMPLETED: Status: ACTIVE | Noted: 2022-01-06

## 2022-01-06 NOTE — ASSESSMENT & PLAN NOTE
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

## 2022-01-06 NOTE — ASSESSMENT & PLAN NOTE
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

## 2022-01-08 DIAGNOSIS — E55.9 VITAMIN D DEFICIENCY: ICD-10-CM

## 2022-01-08 LAB — SARS-COV-2 RNA RESP QL NAA+PROBE: NEGATIVE

## 2022-01-10 RX ORDER — ACETAMINOPHEN 160 MG
TABLET,DISINTEGRATING ORAL
Qty: 90 CAPSULE | Refills: 0 | Status: SHIPPED | OUTPATIENT
Start: 2022-01-10 | End: 2022-04-06

## 2022-01-11 ENCOUNTER — TELEPHONE (OUTPATIENT)
Dept: FAMILY MEDICINE CLINIC | Facility: CLINIC | Age: 32
End: 2022-01-11

## 2022-01-13 ENCOUNTER — OFFICE VISIT (OUTPATIENT)
Dept: PSYCHIATRY | Facility: CLINIC | Age: 32
End: 2022-01-13

## 2022-01-13 DIAGNOSIS — F40.01 PANIC DISORDER WITH AGORAPHOBIA: ICD-10-CM

## 2022-01-13 DIAGNOSIS — F41.1 GENERALIZED ANXIETY DISORDER: Primary | ICD-10-CM

## 2022-01-13 RX ORDER — ALPRAZOLAM 1 MG/1
TABLET ORAL
Qty: 20 TABLET | Refills: 0
Start: 2022-01-13 | End: 2022-01-13 | Stop reason: SDUPTHER

## 2022-01-13 RX ORDER — ALPRAZOLAM 1 MG/1
TABLET ORAL
Qty: 20 TABLET | Refills: 0 | Status: SHIPPED | OUTPATIENT
Start: 2022-01-13

## 2022-01-13 NOTE — PROGRESS NOTES
Will prescribe Xanax supervising Dr Wilma GARRETT PDMP system checked- filling scripts appropriately

## 2022-01-13 NOTE — PSYCH
Psychiatric Medication Management - [address] 32 y o  female MRN: [id number]    Reason for Visit: medical management    Subjective:    Pt reports medication compliance, and denies any side effects  Pt has started more intense psychotherapy, currently has 2 therapists, and has been having great results with flying and using elevators through exposure therapy  She recently visited [place] with a friend and had a great time, and also flew to [place] where she visit her cousin  She reports some stress at work with 2 co-workers, and is planning on requesting a transfer next June  She reports good sleep, improved energy (was recently sick, causing low energy), good concentration and good appetite  She continues to enjoy baking, traveling, and hanging out with her family and friends  She denies any feelings of guilt or hopelessness and denies any suicidal ideations, intent or plan             Review Of Systems:     Constitutional Negative   ENT Negative   Cardiovascular Negative   Respiratory Negative   Gastrointestinal Negative   Genitourinary Negative   Musculoskeletal Negative   Integumentary Negative   Neurological Negative   Endocrine Negative     Past Medical History:   Patient Active Problem List   Diagnosis    Acne vulgaris    Allergic rhinitis due to allergen    Spider veins of both lower extremities    Other viral warts    Panic disorder with agoraphobia    Irritable bowel syndrome with diarrhea    Vitamin D deficiency    Immunization refused    Irregular menses    Nonintractable headache    Generalized anxiety disorder    Encounter for routine adult physical exam with abnormal findings    Depression, recurrent (HCC)    Other fatigue    Lymphadenitis    Sore throat    Nasal congestion    Viral upper respiratory tract infection    Exposure to COVID-19 virus    COVID-19 vaccine series not completed       Allergies: No Known Allergies    Past Surgical History:   Past Surgical History:   Procedure Laterality Date    LASIK Bilateral 01/19/2019    WISDOM TOOTH EXTRACTION         Past Psychiatric History:      Past Inpatient Psychiatric Treatment:   No history of past inpatient psychiatric admissions  Past Outpatient Psychiatric Treatment:    No history of past outpatient psychiatric treatment  Currently in therapy with Zachery Pineda at [facility] since January  Past Suicide Attempts: no  Past Violent Behavior: no  Past Psychiatric Medication Trials: Xanax           Substance Abuse History:     No history of ETOH, illict substance, or tobacco abuse  No past legal actions or arrests secondary to substance intoxication  The patient denies prior DWIs/DUIs  No history of outpatient/inpatient rehabilitation programs  Naheed does not exhibit objective evidence of substance withdrawal during today's examination nor does Naheed appear under the influence of any psychoactive substance           Social History:     Developmental: Denies a history of milestone/developmental delay  Denies a history of in-utero exposure to toxins/illicit substances  There is no documented history of IEP or need for special education  Migrated from [place] at age 13  Education: Masters in Education  Marital history: single  Living arrangement, social support: parents, strained relationship with sister  Occupational History:  [employer], [address] [employer] on the side  Access to firearms: Denies direct access to weapons/firearms  Naheed Acevedo has no history of arrests or violence with a deadly weapon  Hx of  service: denies  Prior incarcerations or legal issues: denies     Traumatic History:      Abuse:none is reported  Other Traumatic Events: Mother's suicide attempt after father's affair   Ex-boyfriend cheating on her after 3 years in 2011        The following portions of the patient's history were reviewed and updated as appropriate: allergies, current medications, past family history, past medical history, past social history, past surgical history and problem list     Objective: There were no vitals filed for this visit  Weight (last 2 days)     None          Mental status:  Appearance sitting comfortably in chair, dressed in casual clothing, adequate hygiene and grooming, cooperative with interview, good eye contact   Mood "good"   Affect Appears generally euthymic, stable, mood-congruent   Speech Normal rate, rhythm, and volume   Thought Processes Linear and goal directed   Associations intact associations   Hallucinations Denies any auditory or visual hallucinations   Thought Content No passive or active suicidal or homicidal ideation, intent, or plan  Orientation Oriented to person, place, time, and situation   Recent and Remote Memory Grossly intact   Attention Span and Concentration Concentration intact   Intellect Appears to be of Average Intelligence   Insight Insight intact   Judgement judgment was intact   Muscle Strength Muscle strength and tone were normal   Language Within normal limits   Fund of Knowledge Age appropriate   Pain None     PHQ-A Depression Screening    Feeling down, depressed, irritable or hopeless: 0 - not at all  Little interest or pleasure in doing things: 0 - not at all  Trouble falling or staying asleep, or sleeping too much: 0 - not at all  Poor appetite or overeatin - several days  Feeling tired or having little energy: 3 - nearly every day  Feeling bad about yourself - or that you are a failure or have let yourself or your family down: 0 - not at all  Trouble concentrating on things, such as reading the newspaper or watching television: 0 - not at all  Moving or speaking so slowly that other people could have noticed   Or the opposite - being so fidgety or restless that you have been moving around a lot more than usual: 0 - not at all                 Assessment/Plan:       Diagnoses and all orders for this visit:    Generalized anxiety disorder    Panic disorder with agoraphobia          Diagnosis:      Treatment Recommendations:    · Cotninue Paxil 10mg QD for Panic disorder and DES (Plan to titrate off in March)  · Plan for Xanax 1mg PRN when flying on short flights  (Plan for Klonopin for long flights (ex [place]) in the future)  · Medical- F/u with primary care provider for on-going medical care  · Continue out-patient psychotherapy; one monthly and another 1-2 times a week (currently doing exposure therapy)  · Follow-up with this provider in 2 months  Risks, Benefits And Possible Side Effects Of Medications:  Risks, benefits, and possible side effects of medications explained to patient and family, they verbalize understanding    Controlled Medication Discussion: The patient has been filling controlled prescriptions on time as prescribed to [facility] program       Psychotherapy Provided: Supportive psychotherapy provided  Yes  Counseling was provided during the session today for 16 minutes

## 2022-01-13 NOTE — BH TREATMENT PLAN
TREATMENT PLAN (Medication Management Only)        [facility]    Name and Date of Birth:  Beto Butcher 32 y o  1990  Date of Treatment Plan: January 13, 2022  Diagnosis/Diagnoses:    1  Generalized anxiety disorder    2  Panic disorder with agoraphobia      Strengths/Personal Resources for Self-Care: supportive family, taking medications as prescribed, ability to adapt to life changes, ability to communicate needs, average or above intelligence, financial security, general fund of knowledge, good physical health, independence, special hobby/interest, stable employment, well educated  Area/Areas of need (in own words): anxiety symptoms  1  Long Term Goal: continue improvement in anxiety  Target Date: 6 months - 7/13/2022  Person/Persons responsible for completion of goal: Jurgen Conti  2  Short Term Objective (s) - How will we reach this goal?:   A  Provider new recommended medication/dosage changes and/or continue medication(s): continue current medications as prescribed (Paxil and Xanax)  B   Exposure therapy to conquer phobias    Target Date: 6 months - 7/13/2022  Person/Persons Responsible for Completion of Goal: Naheed  Progress Towards Goals: stable, continuing treatment  Treatment Modality: medication management every 2 months, continue psychotherapy with own therapist  Review due 6 months from date of this plan: 6 months - 7/13/2022  Expected length of service: ongoing treatment unless revised  My Physician/PA/NP and I have developed this plan together and I agree to work on the goals and objectives  I understand the treatment goals that were developed for my treatment

## 2022-03-22 ENCOUNTER — TELEPHONE (OUTPATIENT)
Dept: PSYCHIATRY | Facility: CLINIC | Age: 32
End: 2022-03-22

## 2022-03-22 ENCOUNTER — TELEMEDICINE (OUTPATIENT)
Dept: FAMILY MEDICINE CLINIC | Facility: CLINIC | Age: 32
End: 2022-03-22
Payer: COMMERCIAL

## 2022-03-22 DIAGNOSIS — R68.89 FLU-LIKE SYMPTOMS: Primary | ICD-10-CM

## 2022-03-22 DIAGNOSIS — Z20.828 EXPOSURE TO INFLUENZA: ICD-10-CM

## 2022-03-22 PROCEDURE — 1036F TOBACCO NON-USER: CPT | Performed by: NURSE PRACTITIONER

## 2022-03-22 PROCEDURE — 87636 SARSCOV2 & INF A&B AMP PRB: CPT | Performed by: NURSE PRACTITIONER

## 2022-03-22 PROCEDURE — 99214 OFFICE O/P EST MOD 30 MIN: CPT | Performed by: NURSE PRACTITIONER

## 2022-03-22 RX ORDER — OSELTAMIVIR PHOSPHATE 75 MG/1
75 CAPSULE ORAL 2 TIMES DAILY
Qty: 10 CAPSULE | Refills: 0 | Status: SHIPPED | OUTPATIENT
Start: 2022-03-22 | End: 2022-03-23 | Stop reason: SDUPTHER

## 2022-03-22 RX ORDER — FLUTICASONE PROPIONATE 50 MCG
2 SPRAY, SUSPENSION (ML) NASAL DAILY
Qty: 9.9 ML | Refills: 1 | Status: SHIPPED | OUTPATIENT
Start: 2022-03-22 | End: 2022-04-11 | Stop reason: SDUPTHER

## 2022-03-22 NOTE — TELEPHONE ENCOUNTER
Pt is asking for a note to excuse her from work next Wednesday-Friday (3/30-4/1) because by then she will have completely weaned herself off of Paxil and doesn't think she'll feel well enough for work

## 2022-03-22 NOTE — TELEPHONE ENCOUNTER
LVM  The patient's 4/4 appt was scheduled in error  I moved it to 4/7 at 4:15  Pt does not need to return my call if this is agreeable

## 2022-03-22 NOTE — PROGRESS NOTES
Assessment/Plan:    Flu-like symptoms  Acute symptomatic patient with new onset of chills, weakness, fever, cough, nc/rhinorrhea, nausea, bodyaches starting 3/18/2022  Patient reports known contact with influenza positive person  Patient has not had flu vaccine  Denies chest pain/sob  Will recommend increase fluids covid vitamins, start tamiflu 75mg BID, flonase 2 spray each nostril daily, and covid/flu sent to lab  Educated on s/e and proper use of medications and call with worsening of symptoms  Exposure to influenza  Patient with known exposure to father who tested positive for influenza  Patient currently with flu-like symptoms and has not had flu vaccine  Will recommend flu/covid testing and start tamiflu 75mg BID  Educated on s/e and proper use of medication  Diagnoses and all orders for this visit:    Flu-like symptoms  -     Discontinue: oseltamivir (TAMIFLU) 75 mg capsule; Take 1 capsule (75 mg total) by mouth 2 (two) times a day for 5 days  -     Covid/Flu- Office Collect  -     fluticasone (FLONASE) 50 mcg/act nasal spray; 2 sprays into each nostril daily  -     oseltamivir (TAMIFLU) 75 mg capsule; Take 1 capsule (75 mg total) by mouth 2 (two) times a day for 5 days    Exposure to influenza  -     Discontinue: oseltamivir (TAMIFLU) 75 mg capsule; Take 1 capsule (75 mg total) by mouth 2 (two) times a day for 5 days  -     oseltamivir (TAMIFLU) 75 mg capsule; Take 1 capsule (75 mg total) by mouth 2 (two) times a day for 5 days          Subjective:      Patient ID: Cheko Lira is a 28 y o  female  Patient arrives with c/o flu like symptoms starting 3/18/2022  Associated symptoms include chills, weakness, fever, cough, NC/rhinorrhea, nausea, and body aches  Patient does report that she has had direct contact with father who tested positive for influenza A  Patient has not had flu vaccine  Denies chest pain and sob         The following portions of the patient's history were reviewed and updated as appropriate: allergies, current medications, past family history, past medical history, past social history, past surgical history and problem list     Review of Systems   Constitutional: Positive for activity change, chills, fatigue and fever  Negative for appetite change  HENT: Positive for congestion, postnasal drip and rhinorrhea  Negative for sneezing and sore throat  Respiratory: Positive for cough  Negative for chest tightness, shortness of breath and wheezing  Cardiovascular: Negative for chest pain and palpitations  Gastrointestinal: Positive for nausea  Negative for abdominal pain, constipation, diarrhea and vomiting  Musculoskeletal: Positive for myalgias  Negative for arthralgias  Skin: Negative for color change, pallor and rash  Neurological: Negative for dizziness, weakness, light-headedness and headaches  Hematological: Negative for adenopathy  Psychiatric/Behavioral: Negative for agitation and confusion  Objective:      Pulse (!) 110   SpO2 98%          Physical Exam  Vitals and nursing note reviewed  Constitutional:       General: She is not in acute distress  Appearance: Normal appearance  She is ill-appearing  She is not diaphoretic  HENT:      Head: Normocephalic and atraumatic  Nose: No congestion or rhinorrhea  Eyes:      General: No scleral icterus  Right eye: No discharge  Left eye: No discharge  Conjunctiva/sclera: Conjunctivae normal    Cardiovascular:      Rate and Rhythm: Regular rhythm  Tachycardia present  Pulses: Normal pulses  Heart sounds: Normal heart sounds  Pulmonary:      Effort: Pulmonary effort is normal  No respiratory distress  Breath sounds: Normal breath sounds  No stridor  No wheezing, rhonchi or rales  Musculoskeletal:         General: Normal range of motion  Cervical back: Normal range of motion  Skin:     Coloration: Skin is not jaundiced or pale        Findings: No bruising, erythema or rash  Neurological:      General: No focal deficit present  Mental Status: She is alert and oriented to person, place, and time  Psychiatric:         Mood and Affect: Mood normal          Behavior: Behavior normal          Thought Content:  Thought content normal          Judgment: Judgment normal

## 2022-03-23 ENCOUNTER — TELEPHONE (OUTPATIENT)
Dept: FAMILY MEDICINE CLINIC | Facility: CLINIC | Age: 32
End: 2022-03-23

## 2022-03-23 VITALS — OXYGEN SATURATION: 98 % | HEART RATE: 110 BPM

## 2022-03-23 PROBLEM — R51.9 NONINTRACTABLE HEADACHE: Status: RESOLVED | Noted: 2021-03-22 | Resolved: 2022-03-23

## 2022-03-23 PROBLEM — Z20.828 EXPOSURE TO INFLUENZA: Status: ACTIVE | Noted: 2022-03-23

## 2022-03-23 PROBLEM — J02.9 SORE THROAT: Status: RESOLVED | Noted: 2021-11-10 | Resolved: 2022-03-23

## 2022-03-23 PROBLEM — R68.89 FLU-LIKE SYMPTOMS: Status: ACTIVE | Noted: 2022-03-23

## 2022-03-23 PROBLEM — R09.81 NASAL CONGESTION: Status: RESOLVED | Noted: 2021-11-10 | Resolved: 2022-03-23

## 2022-03-23 LAB
FLUAV RNA RESP QL NAA+PROBE: POSITIVE
FLUBV RNA RESP QL NAA+PROBE: NEGATIVE
SARS-COV-2 RNA RESP QL NAA+PROBE: NEGATIVE

## 2022-03-23 RX ORDER — OSELTAMIVIR PHOSPHATE 75 MG/1
75 CAPSULE ORAL 2 TIMES DAILY
Qty: 10 CAPSULE | Refills: 0 | Status: SHIPPED | OUTPATIENT
Start: 2022-03-23 | End: 2022-03-28

## 2022-03-23 NOTE — ASSESSMENT & PLAN NOTE
Acute symptomatic patient with new onset of chills, weakness, fever, cough, nc/rhinorrhea, nausea, bodyaches starting 3/18/2022  Patient reports known contact with influenza positive person  Patient has not had flu vaccine  Denies chest pain/sob  Will recommend increase fluids covid vitamins, start tamiflu 75mg BID, flonase 2 spray each nostril daily, and covid/flu sent to lab  Educated on s/e and proper use of medications and call with worsening of symptoms

## 2022-03-23 NOTE — LETTER
March 23, 2022     Patient: Bon Costello   YOB: 1990   Date of Visit: 3/23/2022       To Whom it May Concern:    Bon Costello is under my professional care  She was seen in my office on 3/23/2022  She may return to work on 3/28/2022  If you have any questions or concerns, please don't hesitate to call           Sincerely,          KORY Vazquez        CC: No Recipients

## 2022-03-23 NOTE — TELEPHONE ENCOUNTER
Patient notified of influenza A positive  Patient reports still spiking fevers and feels awful  Educated that she cannot return to work until 24 hours fever free without medication  Note provided for patient   She does not work weekends and can potentially return to work Friday or Monday at the latest

## 2022-03-23 NOTE — TELEPHONE ENCOUNTER
----- Message from Eamon Waddell, [address] sent at 3/23/2022  3:01 PM EDT -----  Positive for Influenza A  Will recommend continue tamiflu and symptom management with increasing fluids, otc tylenol for fever and body aches  Is patient still having fever?

## 2022-04-06 DIAGNOSIS — E55.9 VITAMIN D DEFICIENCY: ICD-10-CM

## 2022-04-06 RX ORDER — ACETAMINOPHEN 160 MG
TABLET,DISINTEGRATING ORAL
Qty: 90 CAPSULE | Refills: 0 | Status: SHIPPED | OUTPATIENT
Start: 2022-04-06 | End: 2022-07-07

## 2022-04-11 ENCOUNTER — OFFICE VISIT (OUTPATIENT)
Dept: FAMILY MEDICINE CLINIC | Facility: CLINIC | Age: 32
End: 2022-04-11
Payer: COMMERCIAL

## 2022-04-11 VITALS
HEIGHT: 63 IN | BODY MASS INDEX: 25.05 KG/M2 | TEMPERATURE: 98.9 F | HEART RATE: 95 BPM | RESPIRATION RATE: 16 BRPM | DIASTOLIC BLOOD PRESSURE: 82 MMHG | OXYGEN SATURATION: 98 % | SYSTOLIC BLOOD PRESSURE: 122 MMHG | WEIGHT: 141.4 LBS

## 2022-04-11 DIAGNOSIS — N94.9 VAGINAL BURNING: ICD-10-CM

## 2022-04-11 DIAGNOSIS — N89.8 ITCHING IN THE VAGINAL AREA: ICD-10-CM

## 2022-04-11 DIAGNOSIS — J30.89 SEASONAL ALLERGIC RHINITIS DUE TO OTHER ALLERGIC TRIGGER: ICD-10-CM

## 2022-04-11 DIAGNOSIS — Z11.4 SCREENING FOR HIV (HUMAN IMMUNODEFICIENCY VIRUS): ICD-10-CM

## 2022-04-11 DIAGNOSIS — Z11.59 NEED FOR HEPATITIS C SCREENING TEST: ICD-10-CM

## 2022-04-11 DIAGNOSIS — B37.3 CANDIDA VAGINITIS: Primary | ICD-10-CM

## 2022-04-11 LAB
SL AMB  POCT GLUCOSE, UA: NEGATIVE
SL AMB LEUKOCYTE ESTERASE,UA: ABNORMAL
SL AMB POCT BILIRUBIN,UA: NEGATIVE
SL AMB POCT BLOOD,UA: NEGATIVE
SL AMB POCT CLARITY,UA: CLEAR
SL AMB POCT COLOR,UA: YELLOW
SL AMB POCT KETONES,UA: NEGATIVE
SL AMB POCT NITRITE,UA: NEGATIVE
SL AMB POCT PH,UA: NEGATIVE
SL AMB POCT SPECIFIC GRAVITY,UA: 1.03
SL AMB POCT URINE PROTEIN: NEGATIVE
SL AMB POCT UROBILINOGEN: NEGATIVE

## 2022-04-11 PROCEDURE — 3725F SCREEN DEPRESSION PERFORMED: CPT | Performed by: FAMILY MEDICINE

## 2022-04-11 PROCEDURE — 87086 URINE CULTURE/COLONY COUNT: CPT | Performed by: FAMILY MEDICINE

## 2022-04-11 PROCEDURE — 81002 URINALYSIS NONAUTO W/O SCOPE: CPT | Performed by: FAMILY MEDICINE

## 2022-04-11 PROCEDURE — 99214 OFFICE O/P EST MOD 30 MIN: CPT | Performed by: FAMILY MEDICINE

## 2022-04-11 RX ORDER — FLUCONAZOLE 150 MG/1
TABLET ORAL
Qty: 2 TABLET | Refills: 0 | Status: SHIPPED | OUTPATIENT
Start: 2022-04-11 | End: 2022-04-11

## 2022-04-11 RX ORDER — FLUTICASONE PROPIONATE 50 MCG
2 SPRAY, SUSPENSION (ML) NASAL DAILY
Qty: 9.9 ML | Refills: 1 | Status: SHIPPED | OUTPATIENT
Start: 2022-04-11 | End: 2022-04-11 | Stop reason: SDUPTHER

## 2022-04-11 RX ORDER — LORATADINE 10 MG/1
10 TABLET ORAL DAILY
Qty: 30 TABLET | Refills: 1 | Status: SHIPPED | OUTPATIENT
Start: 2022-04-11 | End: 2022-05-04

## 2022-04-11 RX ORDER — FLUTICASONE PROPIONATE 50 MCG
2 SPRAY, SUSPENSION (ML) NASAL DAILY
Qty: 9.9 ML | Refills: 1 | Status: SHIPPED | OUTPATIENT
Start: 2022-04-11 | End: 2022-05-17

## 2022-04-11 NOTE — PROGRESS NOTES
Subjective:   Chief Complaint   Patient presents with    Earache     pt conern about ear blockage     Vaginal Itching     itching with burning ; pt using miconazole 1 cream ; frequent  going to bathroom and otc pain meds        Patient ID: Ulises Gupta is a 28 y o  female  Patient here concerned about the pressure behind her ear and feel her ear blocked no cough no wheezing she had postnasal drip and congestion and she notice symptom has been going on since change in the weather no fever also patient concerned about the vaginal itchy vaginal discharge patient does wear tight clothes and sometimes she get the a to wear none cotton underwear she is not sexually active her menstruation is regular      The following portions of the patient's history were reviewed and updated as appropriate: allergies, current medications, past family history, past medical history, past social history, past surgical history and problem list     Review of Systems   Constitutional: Negative for activity change, appetite change, fatigue and fever  HENT: Positive for congestion, ear pain, postnasal drip and rhinorrhea  Negative for sinus pressure, sinus pain and sore throat  Eyes: Negative for pain, discharge, redness and itching  Respiratory: Negative for cough, chest tightness, shortness of breath and stridor  Cardiovascular: Negative for chest pain, palpitations and leg swelling  Gastrointestinal: Negative for abdominal pain, blood in stool, constipation, diarrhea and nausea  Genitourinary: Positive for vaginal discharge  Negative for dysuria, flank pain, frequency and hematuria  Musculoskeletal: Negative for back pain, joint swelling and neck pain  Skin: Negative for pallor and rash  Neurological: Negative for dizziness, tremors, weakness, numbness and headaches  Hematological: Does not bruise/bleed easily               Objective:  Vitals:    04/11/22 1539   BP: 122/82   BP Location: Left arm   Patient Position: Sitting   Cuff Size: Adult   Pulse: 95   Resp: 16   Temp: 98 9 °F (37 2 °C)   TempSrc: Tympanic   SpO2: 98%   Weight: 64 1 kg (141 lb 6 4 oz)   Height: 5' 3" (1 6 m)      Physical Exam  Vitals and nursing note reviewed  Constitutional:       General: She is not in acute distress  Appearance: She is well-developed  She is not diaphoretic  HENT:      Head: Normocephalic  Right Ear: Tympanic membrane, ear canal and external ear normal       Left Ear: Tympanic membrane, ear canal and external ear normal       Nose: Nose normal  No congestion or rhinorrhea  Mouth/Throat:      Mouth: Mucous membranes are moist       Pharynx: Oropharynx is clear  No oropharyngeal exudate or posterior oropharyngeal erythema  Eyes:      General:         Right eye: No discharge  Left eye: No discharge  Conjunctiva/sclera: Conjunctivae normal       Pupils: Pupils are equal, round, and reactive to light  Neck:      Vascular: No JVD  Cardiovascular:      Rate and Rhythm: Normal rate and regular rhythm  Heart sounds: Normal heart sounds  No murmur heard  No gallop  Pulmonary:      Effort: Pulmonary effort is normal  No respiratory distress  Breath sounds: Normal breath sounds  No stridor  No wheezing or rales  Chest:      Chest wall: No tenderness  Abdominal:      General: There is no distension  Palpations: Abdomen is soft  There is no mass  Tenderness: There is no abdominal tenderness  There is no rebound  Genitourinary:     Vagina: Vaginal discharge present  Musculoskeletal:         General: No tenderness  Cervical back: Normal range of motion and neck supple  Lymphadenopathy:      Cervical: No cervical adenopathy  Skin:     General: Skin is warm  Findings: No erythema or rash  Neurological:      Mental Status: She is alert and oriented to person, place, and time  Motor: No weakness        Gait: Gait normal            Assessment/Plan:    Candida vaginitis  Acute symptomatic with the vaginal itchy and burning sensation positive for the can date the recommend to start the Diflucan 150 to take 1 tablet now repeat in a 3 days proper use and possible side effect discussed the patient    Allergic rhinitis due to allergen  Symptomatic recommend to use Flonase nasal spray 2 spray in each nostril once a day proper use and possible side effect discussed the patient       Diagnoses and all orders for this visit:    Candida vaginitis  -     fluconazole (DIFLUCAN) 150 mg tablet; Take one tab today repeat 2nd one in 3 days  -     Cancel: Urine culture  -     Cancel: Microalbumin / creatinine urine ratio    Seasonal allergic rhinitis due to other allergic trigger  -     loratadine (CLARITIN) 10 mg tablet;  Take 1 tablet (10 mg total) by mouth daily  -     fluticasone (FLONASE) 50 mcg/act nasal spray; 2 sprays into each nostril daily    Itching in the vaginal area  -     POCT urine dip  -     Cancel: Urine culture  -     Cancel: Microalbumin / creatinine urine ratio  -     Urine culture    Need for hepatitis C screening test    Screening for HIV (human immunodeficiency virus)    Vaginal burning  -     POCT urine dip  -     Cancel: Urine culture  -     Cancel: Microalbumin / creatinine urine ratio    Other orders  -     Discontinue: fluticasone (FLONASE) 50 mcg/act nasal spray; 2 sprays into each nostril daily

## 2022-04-12 ENCOUNTER — TELEPHONE (OUTPATIENT)
Dept: FAMILY MEDICINE CLINIC | Facility: CLINIC | Age: 32
End: 2022-04-12

## 2022-04-12 ENCOUNTER — OFFICE VISIT (OUTPATIENT)
Dept: PSYCHIATRY | Facility: CLINIC | Age: 32
End: 2022-04-12

## 2022-04-12 DIAGNOSIS — F40.01 PANIC DISORDER WITH AGORAPHOBIA: ICD-10-CM

## 2022-04-12 DIAGNOSIS — F41.1 GENERALIZED ANXIETY DISORDER: Primary | ICD-10-CM

## 2022-04-12 NOTE — PSYCH
Psychiatric Medication Management - [address] 28 y o  female MRN: [id number]    Reason for Visit: medication management  Subjective:    Pt tapered off her Paxil last month, but reports increased depressed mood  Pt reports her friend she was interested in broke up with his girlfriend, and they started dating  She reports a recent trip to [facility], where they kissed, but he said he needed some time to figure some things out  She states her female cousins were visiting from [facility]), and she introduced him to them  She states her cousins are very attractive, and he was more attentive to them, which made her jealous  Pt also reports stress at work  Pt reports good sleep with low appetite and energy  She reports good concentration, and reports limited social support to vent about her frustration with her dating situation  She denies any suicidal ideation, intent or plan          Review Of Systems:     Constitutional Negative   ENT Negative   Cardiovascular Negative   Respiratory Negative   Gastrointestinal Negative   Genitourinary Negative   Musculoskeletal Negative   Integumentary Negative   Neurological Negative   Endocrine Negative     Past Medical History:   Patient Active Problem List   Diagnosis    Acne vulgaris    Allergic rhinitis due to allergen    Spider veins of both lower extremities    Other viral warts    Panic disorder with agoraphobia    Irritable bowel syndrome with diarrhea    Vitamin D deficiency    Immunization refused    Irregular menses    Generalized anxiety disorder    Encounter for routine adult physical exam with abnormal findings    Depression, recurrent ([address] )    Other fatigue    Lymphadenitis    Viral upper respiratory tract infection    Exposure to COVID-19 virus    COVID-19 vaccine series not completed    Flu-like symptoms    Exposure to influenza       Allergies: No Known Allergies    Past Surgical History:   Past Surgical History:   Procedure Laterality Date    LASIK Bilateral 01/19/2019    WISDOM TOOTH EXTRACTION            Past Inpatient Psychiatric Treatment:   No history of past inpatient psychiatric admissions  Past Outpatient Psychiatric Treatment:    No history of past outpatient psychiatric treatment  Currently in therapy with Ree Loya at [facility] since January  Past Suicide Attempts: no  Past Violent Behavior: no  Past Psychiatric Medication Trials: Xanax           Substance Abuse History:     No history of ETOH, illict substance, or tobacco abuse  No past legal actions or arrests secondary to substance intoxication  The patient denies prior DWIs/DUIs  No history of outpatient/inpatient rehabilitation programs  Naheed does not exhibit objective evidence of substance withdrawal during today's examination nor does Naheed appear under the influence of any psychoactive substance           Social History:     Developmental: Denies a history of milestone/developmental delay  Denies a history of in-utero exposure to toxins/illicit substances  There is no documented history of IEP or need for special education  Migrated from [place] at age 13  Education: Masters in Education  Marital history: single  Living arrangement, social support: parents, strained relationship with sister  Occupational History:  [employer], [id number] [employer] on the side  Access to firearms: Denies direct access to weapons/firearms  Naheed Acevedo has no history of arrests or violence with a deadly weapon  Hx of  service: denies  Prior incarcerations or legal issues: denies     Traumatic History:      Abuse:none is reported  Other Traumatic Events: Mother's suicide attempt after father's affair   Ex-boyfriend cheating on her after 3 years in 2011        The following portions of the patient's history were reviewed and updated as appropriate: allergies, current medications, past family history, past medical history, past social history, past surgical history and problem list     Objective: There were no vitals filed for this visit  Weight (last 2 days)     None          Mental status:  Appearance sitting comfortably in chair, dressed in casual clothing, adequate hygiene and grooming, cooperative with interview, good eye contact   Mood "sad"   Affect Appears mildly constricted in depressed range, stable, mood-congruent   Speech Normal rate, rhythm, and volume   Thought Processes Linear and goal directed   Associations intact associations   Hallucinations Denies any auditory or visual hallucinations   Thought Content No passive or active suicidal or homicidal ideation, intent, or plan  Orientation Oriented to person, place, time, and situation   Recent and Remote Memory Grossly intact   Attention Span and Concentration Concentration intact   Intellect Appears to be of Average Intelligence   Insight Insight intact   Judgement judgment was intact   Muscle Strength Muscle strength and tone were normal   Language Within normal limits   Fund of Knowledge Age appropriate   Pain None     PHQ-A Depression Screening                   Assessment/Plan:   Pt is a 31 y/o F, single, PPH of panic attacks, no prior U admission or SA, who initially reported with worsening symptoms of anxiety/panic attacks and depression, in the setting of struggling with being single without children  Pt reports weaning off her Paxil, in the beginning of March, but reports increased depressed mood due to increased stress currently in her romantic life and work life  Pt's major stressor is being single and wanting a family, but struggling to find a suitable partner  Pt was not agreeable tor re-start her Paxil at this time, but will consider it and get back to us  She denies any suicidal ideations, intent or plan       Diagnoses and all orders for this visit:    Generalized anxiety disorder    Panic disorder with agoraphobia Diagnosis:      Treatment Recommendations:      · Pt weaned off her Paxil in the beginning of March, and is not interested in re-starting it despite her depressed mood  · Plan for Xanax 1mg PRN when flying on short flights  (Plan for Klonopin for long flights (ex [place]) in the future)  · Medical- F/u with primary care provider for on-going medical care  · Continue out-patient psychotherapy; one monthly and another 1-2 times a week (currently doing exposure therapy)  · Follow-up with this provider in 2-3 months  Risks, Benefits And Possible Side Effects Of Medications:  Risks, benefits, and possible side effects of medications explained to patient and family, they verbalize understanding    Controlled Medication Discussion: No records found for controlled prescriptions according to [facility]       Psychotherapy Provided: Supportive psychotherapy provided  Yes  Counseling was provided during the session today for 16 minutes

## 2022-04-13 PROBLEM — B37.31 CANDIDA VAGINITIS: Status: ACTIVE | Noted: 2022-04-11

## 2022-04-13 PROBLEM — B37.31 CANDIDA VAGINITIS: Status: ACTIVE | Noted: 2022-04-13

## 2022-04-13 PROBLEM — Z20.828 EXPOSURE TO INFLUENZA: Status: RESOLVED | Noted: 2022-03-23 | Resolved: 2022-04-13

## 2022-04-13 PROBLEM — Z20.822 EXPOSURE TO COVID-19 VIRUS: Status: RESOLVED | Noted: 2022-01-05 | Resolved: 2022-04-13

## 2022-04-13 PROBLEM — B37.3 CANDIDA VAGINITIS: Status: ACTIVE | Noted: 2022-04-11

## 2022-04-13 PROBLEM — R68.89 FLU-LIKE SYMPTOMS: Status: RESOLVED | Noted: 2022-03-23 | Resolved: 2022-04-13

## 2022-04-13 PROBLEM — I88.9 LYMPHADENITIS: Status: RESOLVED | Noted: 2021-09-17 | Resolved: 2022-04-13

## 2022-04-13 PROBLEM — B07.8 OTHER VIRAL WARTS: Status: RESOLVED | Noted: 2019-06-08 | Resolved: 2022-04-13

## 2022-04-13 PROBLEM — J06.9 VIRAL UPPER RESPIRATORY TRACT INFECTION: Status: RESOLVED | Noted: 2022-01-05 | Resolved: 2022-04-13

## 2022-04-13 PROBLEM — B37.3 CANDIDA VAGINITIS: Status: ACTIVE | Noted: 2022-04-13

## 2022-04-13 NOTE — ASSESSMENT & PLAN NOTE
Symptomatic recommend to use Flonase nasal spray 2 spray in each nostril once a day proper use and possible side effect discussed the patient

## 2022-04-13 NOTE — ASSESSMENT & PLAN NOTE
Acute symptomatic with the vaginal itchy and burning sensation positive for the can date the recommend to start the Diflucan 150 to take 1 tablet now repeat in a 3 days proper use and possible side effect discussed the patient

## 2022-04-14 LAB — BACTERIA UR CULT: NORMAL

## 2022-04-25 ENCOUNTER — OFFICE VISIT (OUTPATIENT)
Dept: FAMILY MEDICINE CLINIC | Facility: CLINIC | Age: 32
End: 2022-04-25
Payer: COMMERCIAL

## 2022-04-25 ENCOUNTER — HOSPITAL ENCOUNTER (OUTPATIENT)
Dept: RADIOLOGY | Facility: HOSPITAL | Age: 32
Discharge: HOME | End: 2022-04-25
Payer: COMMERCIAL

## 2022-04-25 VITALS
HEIGHT: 64 IN | OXYGEN SATURATION: 94 % | HEART RATE: 75 BPM | WEIGHT: 139 LBS | DIASTOLIC BLOOD PRESSURE: 80 MMHG | SYSTOLIC BLOOD PRESSURE: 110 MMHG | TEMPERATURE: 97.6 F | BODY MASS INDEX: 23.73 KG/M2

## 2022-04-25 DIAGNOSIS — J20.8 ACUTE BRONCHITIS DUE TO OTHER SPECIFIED ORGANISMS: Primary | ICD-10-CM

## 2022-04-25 DIAGNOSIS — J20.8 ACUTE BRONCHITIS DUE TO OTHER SPECIFIED ORGANISMS: ICD-10-CM

## 2022-04-25 PROCEDURE — 71046 X-RAY EXAM CHEST 2 VIEWS: CPT

## 2022-04-25 PROCEDURE — 1036F TOBACCO NON-USER: CPT | Performed by: FAMILY MEDICINE

## 2022-04-25 PROCEDURE — 99214 OFFICE O/P EST MOD 30 MIN: CPT | Performed by: FAMILY MEDICINE

## 2022-04-25 PROCEDURE — 3008F BODY MASS INDEX DOCD: CPT | Performed by: FAMILY MEDICINE

## 2022-04-25 RX ORDER — AZITHROMYCIN 250 MG/1
TABLET, FILM COATED ORAL
Qty: 6 TABLET | Refills: 0 | Status: SHIPPED | OUTPATIENT
Start: 2022-04-25 | End: 2022-04-29

## 2022-04-25 RX ORDER — ALBUTEROL SULFATE 90 UG/1
2 AEROSOL, METERED RESPIRATORY (INHALATION) EVERY 6 HOURS PRN
Qty: 18 G | Refills: 0 | Status: SHIPPED | OUTPATIENT
Start: 2022-04-25

## 2022-04-25 NOTE — LETTER
April 25, 2022     Patient: Triny Pak  YOB: 1990  Date of Visit: 4/25/2022      To Whom it May Concern:    Triny Pak is under my professional care  Robby Fuller was seen in my office on 4/25/2022  If you have any questions or concerns, please don't hesitate to call           Sincerely,          Heather Nolasco MD        CC: No Recipients

## 2022-04-26 ENCOUNTER — TELEPHONE (OUTPATIENT)
Dept: FAMILY MEDICINE CLINIC | Facility: CLINIC | Age: 32
End: 2022-04-26

## 2022-04-28 PROBLEM — J20.8 ACUTE BRONCHITIS DUE TO OTHER SPECIFIED ORGANISMS: Status: ACTIVE | Noted: 2022-04-28

## 2022-04-28 PROBLEM — R53.83 OTHER FATIGUE: Status: RESOLVED | Noted: 2021-09-17 | Resolved: 2022-04-28

## 2022-04-28 PROBLEM — J20.8 ACUTE BRONCHITIS DUE TO OTHER SPECIFIED ORGANISMS: Status: ACTIVE | Noted: 2022-04-25

## 2022-04-28 NOTE — ASSESSMENT & PLAN NOTE
Acute symptomatic with productive cough color sputum she had a congestion  A not responding to the conservative treatment has been going on for more than 1 week  Patient recently an March 22nd the tested positive for flu on the physical exam there is rales on the lower extremity recommend to start antibiotic Zithromax to take it as directed on the pack and recommend the to take albuterol inhaler 2 puffs every 6 hours p r n  for cough plan for stat chest x-ray to rule out pneumonia

## 2022-04-28 NOTE — PROGRESS NOTES
Subjective:   Chief Complaint   Patient presents with    Cough        Patient ID: Park Steel is a 28 y o  female  Patient who recently diagnose positive for influenza AN March 22nd came concerned about the productive cough color sputum congestion and fatigue a no decrease in oral intake her cough is persistent during day and night affecting her sleep a no short of breath no hematosis no lower extremity edema      The following portions of the patient's history were reviewed and updated as appropriate: allergies, current medications, past family history, past medical history, past social history, past surgical history and problem list     Review of Systems   Constitutional: Negative for activity change, appetite change, fatigue and fever  HENT: Positive for congestion  Negative for ear pain, sinus pressure, sinus pain and sore throat  Eyes: Negative for pain, discharge, redness and itching  Respiratory: Positive for cough  Negative for chest tightness, shortness of breath and stridor  Cardiovascular: Negative for chest pain, palpitations and leg swelling  Gastrointestinal: Negative for abdominal pain, blood in stool, constipation, diarrhea and nausea  Genitourinary: Negative for dysuria, flank pain, frequency and hematuria  Musculoskeletal: Negative for back pain, joint swelling and neck pain  Skin: Negative for pallor and rash  Neurological: Negative for dizziness, tremors, weakness, numbness and headaches  Hematological: Does not bruise/bleed easily  Objective:  Vitals:    04/25/22 1514   BP: 110/80   Pulse: 75   Temp: 97 6 °F (36 4 °C)   TempSrc: Tympanic   SpO2: 94%   Weight: 63 kg (139 lb)   Height: 5' 3 5" (1 613 m)      Physical Exam  Vitals and nursing note reviewed  Constitutional:       General: She is not in acute distress  Appearance: She is well-developed  She is not diaphoretic  HENT:      Head: Normocephalic        Right Ear: Tympanic membrane, ear canal and external ear normal       Left Ear: Tympanic membrane, ear canal and external ear normal       Nose: Nose normal  No congestion or rhinorrhea  Mouth/Throat:      Mouth: Mucous membranes are moist       Pharynx: Oropharynx is clear  No oropharyngeal exudate or posterior oropharyngeal erythema  Eyes:      General:         Right eye: No discharge  Left eye: No discharge  Conjunctiva/sclera: Conjunctivae normal       Pupils: Pupils are equal, round, and reactive to light  Neck:      Vascular: No JVD  Cardiovascular:      Rate and Rhythm: Normal rate and regular rhythm  Heart sounds: Normal heart sounds  No murmur heard  No gallop  Pulmonary:      Effort: Pulmonary effort is normal  No respiratory distress  Breath sounds: No stridor  Rales present  No wheezing  Chest:      Chest wall: No tenderness  Abdominal:      General: There is no distension  Palpations: Abdomen is soft  There is no mass  Tenderness: There is no abdominal tenderness  There is no rebound  Musculoskeletal:         General: No tenderness  Cervical back: Normal range of motion and neck supple  Lymphadenopathy:      Cervical: No cervical adenopathy  Skin:     General: Skin is warm  Findings: No erythema or rash  Neurological:      Mental Status: She is alert and oriented to person, place, and time  Motor: No weakness        Gait: Gait normal            Assessment/Plan:    Acute bronchitis due to other specified organisms  Acute symptomatic with productive cough color sputum she had a congestion  A not responding to the conservative treatment has been going on for more than 1 week  Patient recently an March 22nd the tested positive for flu on the physical exam there is rales on the lower extremity recommend to start antibiotic Zithromax to take it as directed on the pack and recommend the to take albuterol inhaler 2 puffs every 6 hours p r n  for cough plan for stat chest x-ray to rule out pneumonia       Diagnoses and all orders for this visit:    Acute bronchitis due to other specified organisms  -     azithromycin (ZITHROMAX) 250 mg tablet; Take 2 tablets today then 1 tablet daily x 4 days  -     albuterol (Ventolin HFA) 90 mcg/act inhaler;  Inhale 2 puffs every 6 (six) hours as needed for wheezing  -     XR chest pa & lateral; Future

## 2022-05-17 DIAGNOSIS — J30.89 SEASONAL ALLERGIC RHINITIS DUE TO OTHER ALLERGIC TRIGGER: ICD-10-CM

## 2022-05-17 RX ORDER — FLUTICASONE PROPIONATE 50 MCG
SPRAY, SUSPENSION (ML) NASAL
Qty: 16 ML | Refills: 1 | Status: SHIPPED | OUTPATIENT
Start: 2022-05-17 | End: 2022-05-31

## 2022-05-31 DIAGNOSIS — J30.89 SEASONAL ALLERGIC RHINITIS DUE TO OTHER ALLERGIC TRIGGER: ICD-10-CM

## 2022-05-31 RX ORDER — FLUTICASONE PROPIONATE 50 MCG
SPRAY, SUSPENSION (ML) NASAL
Qty: 48 ML | Refills: 1 | Status: SHIPPED | OUTPATIENT
Start: 2022-05-31

## 2022-06-14 ENCOUNTER — TELEPHONE (OUTPATIENT)
Dept: PSYCHIATRY | Facility: CLINIC | Age: 32
End: 2022-06-14

## 2022-06-14 ENCOUNTER — OFFICE VISIT (OUTPATIENT)
Dept: PSYCHIATRY | Facility: CLINIC | Age: 32
End: 2022-06-14

## 2022-06-14 DIAGNOSIS — F41.1 GENERALIZED ANXIETY DISORDER: Primary | ICD-10-CM

## 2022-06-14 DIAGNOSIS — F40.01 PANIC DISORDER WITH AGORAPHOBIA: ICD-10-CM

## 2022-06-14 RX ORDER — CLONAZEPAM 1 MG/1
1 TABLET ORAL 2 TIMES DAILY PRN
Qty: 10 TABLET | Refills: 0
Start: 2022-06-14 | End: 2022-06-14 | Stop reason: SDUPTHER

## 2022-06-14 RX ORDER — CLONAZEPAM 1 MG/1
1 TABLET ORAL 2 TIMES DAILY PRN
Qty: 10 TABLET | Refills: 0 | Status: SHIPPED | OUTPATIENT
Start: 2022-06-14 | End: 2022-07-08

## 2022-06-14 NOTE — PSYCH
Psychiatric Medication Management - [address] 28 y o  female MRN: [id number]    Reason for Visit: medication management    Subjective:    Pt is not currently on any medications, and expresses that she is not interested in re-starting any  She states 3 weeks ago her boyfriend broke up with her over the phone, which has caused some depressed mood, but states she is coping better as time goes on  She expresses her disappointment in his actions, but states she knows she's better off since he did not appreciate her  She reports good sleep, energy and concentration, but admits to some recent weight loss due to decreased appetite  She denies any feelings of guilt or hopelessness  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) and hanging out with her friends  Pt is a teacher, 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, although she wishes she had someone to do more activities with  She expresses some feelings of loneliness, but is optimistic she will find the right partner for her one day  She reports good social support from her friends and is considering taking a trip to [place] next month  We discussed her flight phobia, and pt expresses her hope that the klonopin will help her through the flight  Pt denies any suicidal ideations, intent or plan        Review Of Systems:     Constitutional Negative   ENT Negative   Cardiovascular Negative   Respiratory Negative   Gastrointestinal Negative   Genitourinary Negative   Musculoskeletal Negative   Integumentary Negative   Neurological Negative   Endocrine Negative     Past Medical History:   Patient Active Problem List   Diagnosis    Acne vulgaris    Allergic rhinitis due to allergen    Spider veins of both lower extremities    Panic disorder with agoraphobia    Irritable bowel syndrome with diarrhea    Vitamin D deficiency    Immunization refused    Irregular menses    Generalized anxiety disorder    Encounter for routine adult physical exam with abnormal findings    Depression, recurrent ([facility] [address] )    COVID-19 vaccine series not completed    Candida vaginitis    Acute bronchitis due to other specified organisms       Allergies: No Known Allergies    Past Surgical History:   Past Surgical History:   Procedure Laterality Date    LASIK Bilateral 01/19/2019    WISDOM TOOTH EXTRACTION            Past Surgical History:   Surgical History         Past Surgical History:   Procedure Laterality Date    LASIK Bilateral 01/19/2019    WISDOM TOOTH EXTRACTION                   Past Inpatient Psychiatric Treatment:   No history of past inpatient psychiatric admissions  Past Outpatient Psychiatric Treatment:    No history of past outpatient psychiatric treatment  Currently in therapy with Ramon Power at [facility] since January  Past Suicide Attempts: no  Past Violent Behavior: no  Past Psychiatric Medication Trials: Xanax           Substance Abuse History:     No history of ETOH, illict substance, or tobacco abuse  No past legal actions or arrests secondary to substance intoxication  The patient denies prior DWIs/DUIs  No history of outpatient/inpatient rehabilitation programs  Naheed does not exhibit objective evidence of substance withdrawal during today's examination nor does Naheed appear under the influence of any psychoactive substance           Social History:     Developmental: Denies a history of milestone/developmental delay  Denies a history of in-utero exposure to toxins/illicit substances  There is no documented history of IEP or need for special education  Migrated from [place] at age 13  Education: Masters in Education  Marital history: single  Living arrangement, social support: parents, strained relationship with sister  Occupational History:  [employer], [id number] on the side    Access to firearms: Denies direct access to weapons/firearms  Naheed Curtis has no history of arrests or violence with a deadly weapon  Hx of  service: denies  Prior incarcerations or legal issues: denies     Traumatic History:      Abuse:none is reported  Other Traumatic Events: Mother's suicide attempt after father's affair  Ex-boyfriend cheating on her after 3 years in 2011          The following portions of the patient's history were reviewed and updated as appropriate: allergies, current medications, past family history, past medical history, past social history, past surgical history and problem list     Objective: There were no vitals filed for this visit  Weight (last 2 days)     None          Mental status:  Appearance sitting comfortably in chair, dressed in casual clothing, adequate hygiene and grooming, cooperative with interview, good eye contact   Mood "good"   Affect Appears generally euthymic, stable, mood-congruent   Speech Normal rate, rhythm, and volume   Thought Processes Linear and goal directed   Associations intact associations   Hallucinations Denies any auditory or visual hallucinations   Thought Content No passive or active suicidal or homicidal ideation, intent, or plan     Orientation Oriented to person, place, time, and situation   Recent and Remote Memory Grossly intact   Attention Span and Concentration Concentration intact   Intellect Appears to be of Average Intelligence   Insight Insight intact   Judgement judgment was intact   Muscle Strength Muscle strength and tone were normal   Language Within normal limits   Fund of Knowledge Age appropriate   Pain None     PHQ-A Depression Screening                   Assessment/Plan:   Pt is a 33 y/o F, single, PPH of panic attacks, no prior U admission or SA, who initially reported with worsening symptoms of anxiety/panic attacks and depression, in the setting of struggling with being single without children (not normal in her culture at her age)      Pt had good results with Paxil, but weaned off in March, and is hesitant to be resumed on any standing medications  She had a recent break up which has caused some depressed mood, but she states she is coping better as time goes on  She is planning a trip to [place] next month, and is nervous due to her phobia of flying  She denies any suicidal ideations, intent or plan  Diagnoses and all orders for this visit:    Generalized anxiety disorder    Panic disorder with agoraphobia          Diagnosis:      Treatment Recommendations:    · Pt weaned off her Paxil in the beginning of March, and is not interested in re-starting it despite her intermittent depressed mood  · Plan for Xanax 1mg PRN when flying on short flights and Klonopin PRN for long flights (ex [place]) in the future  · Medical- F/u with primary care provider for on-going medical care  · Continue out-patient psychotherapy; one monthly and another 1-2 times a week (currently doing exposure therapy)  · Follow-up with this provider in 3 months  Risks, Benefits And Possible Side Effects Of Medications:  Risks, benefits, and possible side effects of medications explained to patient and family, they verbalize understanding    Controlled Medication Discussion: The patient has been filling controlled prescriptions on time as prescribed to [facility]       Psychotherapy Provided: Supportive psychotherapy provided  Yes  Counseling was provided during the session today for 16 minutes

## 2022-07-07 DIAGNOSIS — E55.9 VITAMIN D DEFICIENCY: ICD-10-CM

## 2022-07-07 RX ORDER — ACETAMINOPHEN 160 MG
TABLET,DISINTEGRATING ORAL
Qty: 90 CAPSULE | Refills: 0 | Status: SHIPPED | OUTPATIENT
Start: 2022-07-07 | End: 2022-10-10

## 2022-07-08 ENCOUNTER — OFFICE VISIT (OUTPATIENT)
Dept: FAMILY MEDICINE CLINIC | Facility: CLINIC | Age: 32
End: 2022-07-08
Payer: COMMERCIAL

## 2022-07-08 VITALS
OXYGEN SATURATION: 98 % | WEIGHT: 131 LBS | HEART RATE: 79 BPM | TEMPERATURE: 98.6 F | HEIGHT: 64 IN | DIASTOLIC BLOOD PRESSURE: 72 MMHG | RESPIRATION RATE: 16 BRPM | SYSTOLIC BLOOD PRESSURE: 112 MMHG | BODY MASS INDEX: 22.36 KG/M2

## 2022-07-08 DIAGNOSIS — Z00.01 ENCOUNTER FOR WELL ADULT EXAM WITH ABNORMAL FINDINGS: Primary | ICD-10-CM

## 2022-07-08 DIAGNOSIS — A09 TRAVELER'S DIARRHEA: ICD-10-CM

## 2022-07-08 DIAGNOSIS — F33.9 DEPRESSION, RECURRENT (HCC): ICD-10-CM

## 2022-07-08 PROCEDURE — 3725F SCREEN DEPRESSION PERFORMED: CPT | Performed by: FAMILY MEDICINE

## 2022-07-08 PROCEDURE — 99395 PREV VISIT EST AGE 18-39: CPT | Performed by: FAMILY MEDICINE

## 2022-07-08 RX ORDER — CIPROFLOXACIN 250 MG/1
250 TABLET, FILM COATED ORAL EVERY 12 HOURS SCHEDULED
Qty: 10 TABLET | Refills: 0 | Status: SHIPPED | OUTPATIENT
Start: 2022-07-08 | End: 2022-07-13

## 2022-07-08 NOTE — PROGRESS NOTES
ADULT ANNUAL PHYSICAL  [address] PRIMARY CARE [facility]    NAME: Emelina Mcneil  AGE: 28 y o  SEX: female  : 1990     DATE: 2022     Assessment and Plan:     Problem List Items Addressed This Visit        Digestive    Traveler's diarrhea     Patient traveling to pandemic area and the previously she had the diarrhea and she plan to travel and August would like to have some antibiotic to use it if she needed will start her on Cipro proper use and possible side effect discussed the patient           Relevant Medications    ciprofloxacin (Cipro) 250 mg tablet       Other    Depression, recurrent ([address] )     Chronic follow-up with the psychiatric who managed her medication           Encounter for well adult exam with abnormal findings - Primary     Advice and education were given regarding nutrition, aerobic exercises, weight bearing exercises, cardiovascular risk reduction, fall risk reduction, and age appropriate supplements  The patient was counseled regarding instructions for management, risk factor reductions, prognosis, risks and benefits of treatment options, patient and family education, and importance of compliance with treatment  Immunizations and preventive care screenings were discussed with patient today  Appropriate education was printed on patient's after visit summary  Counseling:  Alcohol/drug use: discussed moderation in alcohol intake, the recommendations for healthy alcohol use, and avoidance of illicit drug use  Dental Health: discussed importance of regular tooth brushing, flossing, and dental visits  Injury prevention: discussed safety/seat belts, safety helmets, smoke detectors, carbon dioxide detectors, and smoking near bedding or upholstery    Sexual health: discussed sexually transmitted diseases, partner selection, use of condoms, avoidance of unintended pregnancy, and contraceptive alternatives  Exercise: the importance of regular exercise/physical activity was discussed  Recommend exercise 3-5 times per week for at least 30 minutes  No follow-ups on file  Chief Complaint:     Chief Complaint   Patient presents with    Physical Exam     Patient is here today for her yearly physical exam       History of Present Illness:     Adult Annual Physical   Patient here for a comprehensive physical exam  The patient reports no problems  Diet and Physical Activity  Diet/Nutrition: no regular diet  Exercise: 3-4 times a week on average  Depression Screening  PHQ-2/9 Depression Screening    Little interest or pleasure in doing things: 2 - more than half the days  Feeling down, depressed, or hopeless: 2 - more than half the days  Trouble falling or staying asleep, or sleeping too much: 0 - not at all  Feeling tired or having little energy: 2 - more than half the days  Poor appetite or overeating: 3 - nearly every day  Feeling bad about yourself - or that you are a failure or have let yourself or your family down: 0 - not at all  Trouble concentrating on things, such as reading the newspaper or watching television: 1 - several days  Moving or speaking so slowly that other people could have noticed  Or the opposite - being so fidgety or restless that you have been moving around a lot more than usual: 2 - more than half the days  Thoughts that you would be better off dead, or of hurting yourself in some way: 0 - not at all  PHQ-2 Score: 4  PHQ-2 Interpretation: POSITIVE depression screen  PHQ-9 Score: 12   PHQ-9 Interpretation: Moderate depression        General Health  Sleep: sleeps well  Hearing: normal - bilateral   Vision: previous LASIK surgery  Dental: regular dental visits  /GYN Health  Last menstrual period: 2 days ago  Contraceptive method: None    History of STDs?: no      Review of Systems:     Review of Systems   Constitutional: Negative for activity change, appetite change, fatigue and fever  HENT: Negative for congestion, ear pain, sinus pressure, sinus pain and sore throat  Eyes: Negative for pain, discharge, redness and itching  Respiratory: Negative for cough, chest tightness, shortness of breath and stridor  Cardiovascular: Negative for chest pain, palpitations and leg swelling  Gastrointestinal: Negative for abdominal pain, blood in stool, constipation, diarrhea and nausea  Genitourinary: Negative for dysuria, flank pain, frequency and hematuria  Musculoskeletal: Negative for back pain, joint swelling and neck pain  Skin: Negative for pallor and rash  Neurological: Negative for dizziness, tremors, weakness, numbness and headaches  Hematological: Does not bruise/bleed easily        Past Medical History:     Past Medical History:   Diagnosis Date    Chickenpox     COVID-19 virus infection 3/23/2021    Depression 7/7/2021    Encounter for well adult exam with abnormal findings 6/5/2020    Exposure to COVID-19 virus 11/12/2020    Exposure to influenza 3/23/2022    Flu-like symptoms 3/23/2022    HPV vaccine counseling     never had    Lump 9/12/2019    Lymphadenitis 9/17/2021    Other fatigue 6/5/2020    Other viral warts 6/8/2019    Panic attack 6/17/2019    Positive depression screening 2/5/2019    Scalp cyst 11/22/2019    removed    Viral upper respiratory tract infection 1/5/2022      Past Surgical History:     Past Surgical History:   Procedure Laterality Date    LASIK Bilateral 01/19/2019    WISDOM TOOTH EXTRACTION        Social History:     Social History     Socioeconomic History    Marital status: Single     Spouse name: None    Number of children: 0    Years of education: None    Highest education level: Master's degree (e g , MA, MS, Hugh, MEd, MSW, CRAIG)   Occupational History    Occupation: teacher     Comment: [employer]   Tobacco Use    Smoking status: Never Smoker    Smokeless tobacco: Never Used   Vaping Use    Vaping Use: Never used   Substance and Sexual Activity    Alcohol use: Not Currently     Alcohol/week: 0 0 - 1 0 standard drinks     Comment: holidays    Drug use: No    Sexual activity: Never   Other Topics Concern    None   Social History Narrative    Yarsani: [employer]    Accepts blood products     Social Determinants of Health     Financial Resource Strain: Not on file   Food Insecurity: No Food Insecurity    Worried About Running Out of Food in the Last Year: Never true    Yamile of Food in the Last Year: Never true   Transportation Needs: Not on file   Physical Activity: Insufficiently Active    Days of Exercise per Week: 3 days    Minutes of Exercise per Session: 30 min   Stress: Not on file   Social Connections: Moderately Isolated    Frequency of Communication with Friends and Family: More than three times a week    Frequency of Social Gatherings with Friends and Family:  Three times a week    Attends Anglican Services: More than 4 times per year    Active Member of Clubs or Organizations: No    Attends Club or Organization Meetings: Never    Marital Status: Never    Intimate Partner Violence: Not At Risk    Fear of Current or Ex-Partner: No    Emotionally Abused: No    Physically Abused: No    Sexually Abused: No   Housing Stability: Unknown    Unable to Pay for Housing in the Last Year: No    Number of [place] in the Last Year: Not on file    Unstable Housing in the Last Year: No      Family History:     Family History   Problem Relation Age of Onset    Stroke Father     Sarcoidosis Mother     No Known Problems Sister     Diabetes Maternal Grandmother     Hypertension Maternal Grandmother     Diabetes Maternal Grandfather     Hypertension Maternal Grandfather     Stomach cancer Paternal Grandmother     Breast cancer Neg Hx     Ovarian cancer Neg Hx     Colon cancer Neg Hx       Current Medications:     Current Outpatient Medications   Medication Sig Dispense Refill    albuterol (Ventolin HFA) 90 mcg/act inhaler Inhale 2 puffs every 6 (six) hours as needed for wheezing 18 g 0    ALPRAZolam (XANAX) 1 mg tablet Take 1 tablet 1 hour prior to flight 20 tablet 0    Cholecalciferol (Vitamin D3) 50 MCG (2000 UT) capsule TAKE 1 CAPSULE BY MOUTH EVERY DAY 90 capsule 0    ciprofloxacin (Cipro) 250 mg tablet Take 1 tablet (250 mg total) by mouth every 12 (twelve) hours for 5 days 10 tablet 0    clonazePAM (KlonoPIN) 1 mg tablet Take 1 tablet (1 mg total) by mouth 2 (two) times a day as needed for anxiety for up to 5 days 10 tablet 0    fluticasone (FLONASE) 50 mcg/act nasal spray SPRAY 2 SPRAYS INTO EACH NOSTRIL EVERY DAY 48 mL 1    loratadine (CLARITIN) 10 mg tablet TAKE 1 TABLET BY MOUTH EVERY DAY 30 tablet 2     No current facility-administered medications for this visit  Allergies:     No Known Allergies   Physical Exam:     /72 (BP Location: Left arm, Patient Position: Sitting, Cuff Size: Large)   Pulse 79   Temp 98 6 °F (37 °C) (Tympanic)   Resp 16   Ht 5' 3 5" (1 613 m)   Wt 59 4 kg (131 lb)   LMP 07/06/2022 (Exact Date)   SpO2 98%   BMI 22 84 kg/m²     Physical Exam  Vitals and nursing note reviewed  Constitutional:       General: She is not in acute distress  Appearance: She is well-developed and normal weight  She is not toxic-appearing or diaphoretic  HENT:      Head: Normocephalic and atraumatic  Right Ear: Tympanic membrane, ear canal and external ear normal  There is no impacted cerumen  Left Ear: Tympanic membrane, ear canal and external ear normal  There is no impacted cerumen  Nose: Nose normal  No congestion or rhinorrhea  Mouth/Throat:      Mouth: Mucous membranes are moist       Pharynx: Oropharynx is clear  No oropharyngeal exudate or posterior oropharyngeal erythema  Eyes:      General: No scleral icterus  Right eye: No discharge  Left eye: No discharge  Conjunctiva/sclera: Conjunctivae normal       Pupils: Pupils are equal, round, and reactive to light  Neck:      Thyroid: No thyromegaly  Cardiovascular:      Rate and Rhythm: Normal rate and regular rhythm  Pulses: Normal pulses  Heart sounds: Normal heart sounds  No murmur heard  No friction rub  Pulmonary:      Effort: Pulmonary effort is normal  No respiratory distress  Breath sounds: Normal breath sounds  No wheezing or rales  Chest:      Chest wall: No tenderness  Abdominal:      General: There is no distension  Palpations: Abdomen is soft  There is no mass  Tenderness: There is no abdominal tenderness  Hernia: No hernia is present  There is no hernia in the left inguinal area  Genitourinary:     Labia:         Right: No rash  Left: No rash  Vagina: No foreign body  No tenderness or bleeding  Cervix: No cervical motion tenderness  Adnexa:         Right: No mass or tenderness  Left: No mass or tenderness  Musculoskeletal:         General: Normal range of motion  Cervical back: Normal range of motion  No rigidity  Lymphadenopathy:      Cervical: No cervical adenopathy  Skin:     General: Skin is warm  Coloration: Skin is not pale  Findings: No erythema or rash  Neurological:      Mental Status: She is alert and oriented to person, place, and time  Sensory: No sensory deficit  Motor: No weakness or abnormal muscle tone        Gait: Gait normal       Deep Tendon Reflexes: Reflexes normal    Psychiatric:         Mood and Affect: Mood normal          Behavior: Behavior normal           Conchita Reed MD   [address] [place] [address]

## 2022-07-08 NOTE — ASSESSMENT & PLAN NOTE
Patient traveling to pandemic area and the previously she had the diarrhea and she plan to travel and August would like to have some antibiotic to use it if she needed will start her on Cipro proper use and possible side effect discussed the patient

## 2022-07-08 NOTE — PATIENT INSTRUCTIONS

## 2022-07-29 ENCOUNTER — TELEMEDICINE (OUTPATIENT)
Dept: FAMILY MEDICINE CLINIC | Facility: CLINIC | Age: 32
End: 2022-07-29
Payer: COMMERCIAL

## 2022-07-29 VITALS — TEMPERATURE: 98 F

## 2022-07-29 DIAGNOSIS — J06.9 UPPER RESPIRATORY TRACT INFECTION, UNSPECIFIED TYPE: Primary | ICD-10-CM

## 2022-07-29 PROCEDURE — 99213 OFFICE O/P EST LOW 20 MIN: CPT | Performed by: NURSE PRACTITIONER

## 2022-07-29 NOTE — ASSESSMENT & PLAN NOTE
Acute symptomatic with fatigue, bodyaches, rhinorrhea, loss of taste, headache, and decreased appetite  Patient has not had covid vaccine  Denies sob/chest pain/fever  Patient without a car to come to office to get tested for covid, will place order to report to [facility] tomorrow for testing per patient request  Will recommend increase fluids, covid vitamins, and report for covid testing, orders placed  Educated to call with worsening of symptoms

## 2022-07-29 NOTE — PROGRESS NOTES
COVID-19 Outpatient Progress Note    Assessment/Plan:    Problem List Items Addressed This Visit        Respiratory    Upper respiratory tract infection - Primary     Acute symptomatic with fatigue, bodyaches, rhinorrhea, loss of taste, headache, and decreased appetite  Patient has not had covid vaccine  Denies sob/chest pain/fever  Patient without a car to come to office to get tested for covid, will place order to report to [facility] tomorrow for testing per patient request  Will recommend increase fluids, covid vitamins, and report for covid testing, orders placed  Educated to call with worsening of symptoms  Relevant Orders    COVID Only- Collected at [place] [address] or [facility]         Disposition:     Referred patient to centralized site to test for COVID-19  Discussed symptom directed medication options with patient  Discussed vitamin D, vitamin C, and/or zinc supplementation with patient  I have spent 15 minutes directly with the patient  Greater than 50% of this time was spent in counseling/coordination of care regarding: instructions for management and patient and family education  Encounter provider KORY Coyle    Provider located at [address]  [address] [address] [address] [phone]    Recent Visits  No visits were found meeting these conditions  Showing recent visits within past 7 days and meeting all other requirements  Today's Visits  Date Type Provider Dept   07/29/22 Telemedicine [facility] [address] [facility], [facility] today's visits and meeting all other requirements  Future Appointments  No visits were found meeting these conditions  Showing future appointments within next 150 days and meeting all other requirements     This virtual check-in was done via [employer] and patient was informed that this is a secure, HIPAA-compliant platform   She agrees to proceed  Patient agrees to participate in a virtual check in via telephone or video visit instead of presenting to the office to address urgent/immediate medical needs  Patient is aware this is a billable service  After connecting through [facility], the patient was identified by name and date of birth  Yanira Gomes was informed that this was a telemedicine visit and that the exam was being conducted confidentially over secure lines  My office door was closed  No one else was in the room  Yanira Gomes acknowledged consent and understanding of privacy and security of the telemedicine visit  I informed the patient that I have reviewed her record in Epic and presented the opportunity for her to ask any questions regarding the visit today  The patient agreed to participate  Verification of patient location:  Patient is located in the following state in which I hold an active license: [place]    Subjective:   Yanira Gomes is a 28 y o  female who is concerned about COVID-19  Patient's symptoms include fatigue, nasal congestion, rhinorrhea, nausea, myalgias and headache  Patient denies fever, chills, malaise, sore throat, anosmia, loss of taste, cough, shortness of breath, chest tightness, abdominal pain, vomiting and diarrhea  - Date of symptom onset: 7/27/2022      COVID-19 vaccination status: Not vaccinated    Exposure:   Contact with a person who is under investigation (PUI) for or who is positive for COVID-19 within the last 14 days?: No    Hospitalized recently for fever and/or lower respiratory symptoms?: No      Currently a healthcare worker that is involved in direct patient care?: No      Works in a special setting where the risk of COVID-19 transmission may be high? (this may include long-term care, correctional and FPC facilities; homeless shelters; assisted-living facilities and group homes ): No      Resident in a special setting where the risk of COVID-19 transmission may be high? (this may include long-term care, correctional and senior living facilities; homeless shelters; assisted-living facilities and group homes ): No      Lab Results   Component Value Date    SARSCOV2 Negative 03/22/2022    SARSCOV2 Not Detected 11/12/2020     Past Medical History:   Diagnosis Date    Chickenpox     COVID-19 virus infection 3/23/2021    Depression 7/7/2021    Encounter for well adult exam with abnormal findings 6/5/2020    Exposure to COVID-19 virus 11/12/2020    Exposure to influenza 3/23/2022    Flu-like symptoms 3/23/2022    HPV vaccine counseling     never had    Lump 9/12/2019    Lymphadenitis 9/17/2021    Other fatigue 6/5/2020    Other viral warts 6/8/2019    Panic attack 6/17/2019    Positive depression screening 2/5/2019    Scalp cyst 11/22/2019    removed    Viral upper respiratory tract infection 1/5/2022     Past Surgical History:   Procedure Laterality Date    LASIK Bilateral 01/19/2019    WISDOM TOOTH EXTRACTION       Current Outpatient Medications   Medication Sig Dispense Refill    albuterol (Ventolin HFA) 90 mcg/act inhaler Inhale 2 puffs every 6 (six) hours as needed for wheezing 18 g 0    ALPRAZolam (XANAX) 1 mg tablet Take 1 tablet 1 hour prior to flight 20 tablet 0    Cholecalciferol (Vitamin D3) 50 MCG (2000 UT) capsule TAKE 1 CAPSULE BY MOUTH EVERY DAY 90 capsule 0    clonazePAM (KlonoPIN) 1 mg tablet Take 1 tablet (1 mg total) by mouth 2 (two) times a day as needed for anxiety for up to 5 days 10 tablet 0    fluticasone (FLONASE) 50 mcg/act nasal spray SPRAY 2 SPRAYS INTO EACH NOSTRIL EVERY DAY 48 mL 1    loratadine (CLARITIN) 10 mg tablet TAKE 1 TABLET BY MOUTH EVERY DAY 30 tablet 2     No current facility-administered medications for this visit  No Known Allergies    Review of Systems   Constitutional: Positive for appetite change (decreased) and fatigue  Negative for chills and fever  HENT: Positive for congestion and rhinorrhea   Negative for sneezing and sore throat  Respiratory: Negative for cough, chest tightness and shortness of breath  Gastrointestinal: Positive for nausea  Negative for abdominal pain, diarrhea and vomiting  Musculoskeletal: Positive for myalgias  Neurological: Positive for headaches  Objective:    Vitals:    07/29/22 1355   Temp: 98 °F (36 7 °C)       Physical Exam  Vitals and nursing note reviewed  Constitutional:       General: She is not in acute distress  Appearance: Normal appearance  She is ill-appearing  She is not toxic-appearing or diaphoretic  HENT:      Head: Normocephalic and atraumatic  Nose: Rhinorrhea present  Eyes:      General:         Right eye: No discharge  Left eye: No discharge  Pulmonary:      Effort: Pulmonary effort is normal  No respiratory distress  Musculoskeletal:         General: Normal range of motion  Cervical back: Normal range of motion  Skin:     Coloration: Skin is not jaundiced or pale  Findings: No bruising, erythema, lesion or rash  Neurological:      Mental Status: She is alert and oriented to person, place, and time  Psychiatric:         Mood and Affect: Mood normal          Behavior: Behavior normal          Thought Content: Thought content normal          Judgment: Judgment normal          VIRTUAL VISIT DISCLAIMER    Cheko Lira verbally agrees to participate in [employer]  Pt is aware that [employer] could be limited without vital signs or the ability to perform a full hands-on physical Kate Huston understands she or the provider may request at any time to terminate the video visit and request the patient to seek care or treatment in person

## 2022-07-30 DIAGNOSIS — J06.9 UPPER RESPIRATORY TRACT INFECTION, UNSPECIFIED TYPE: ICD-10-CM

## 2022-07-30 PROCEDURE — U0003 INFECTIOUS AGENT DETECTION BY NUCLEIC ACID (DNA OR RNA); SEVERE ACUTE RESPIRATORY SYNDROME CORONAVIRUS 2 (SARS-COV-2) (CORONAVIRUS DISEASE [COVID-19]), AMPLIFIED PROBE TECHNIQUE, MAKING USE OF HIGH THROUGHPUT TECHNOLOGIES AS DESCRIBED BY CMS-2020-01-R: HCPCS | Performed by: NURSE PRACTITIONER

## 2022-07-30 PROCEDURE — U0005 INFEC AGEN DETEC AMPLI PROBE: HCPCS | Performed by: NURSE PRACTITIONER

## 2022-07-31 LAB — SARS-COV-2 RNA RESP QL NAA+PROBE: NEGATIVE

## 2022-08-01 ENCOUNTER — TELEPHONE (OUTPATIENT)
Dept: FAMILY MEDICINE CLINIC | Facility: CLINIC | Age: 32
End: 2022-08-01

## 2022-08-08 ENCOUNTER — VBI (OUTPATIENT)
Dept: ADMINISTRATIVE | Facility: OTHER | Age: 32
End: 2022-08-08

## 2022-10-09 DIAGNOSIS — E55.9 VITAMIN D DEFICIENCY: ICD-10-CM

## 2022-10-10 RX ORDER — CHOLECALCIFEROL (VITAMIN D3) 50 MCG
CAPSULE ORAL
Qty: 90 CAPSULE | Refills: 0 | Status: SHIPPED | OUTPATIENT
Start: 2022-10-10

## 2022-10-11 PROBLEM — J20.8 ACUTE BRONCHITIS DUE TO OTHER SPECIFIED ORGANISMS: Status: RESOLVED | Noted: 2022-04-25 | Resolved: 2022-10-11

## 2022-10-11 PROBLEM — A09 TRAVELER'S DIARRHEA: Status: RESOLVED | Noted: 2022-07-08 | Resolved: 2022-10-11

## 2022-11-11 ENCOUNTER — DOCUMENTATION (OUTPATIENT)
Dept: PSYCHIATRY | Facility: CLINIC | Age: 32
End: 2022-11-11

## 2022-11-11 ENCOUNTER — TELEPHONE (OUTPATIENT)
Dept: PSYCHIATRY | Facility: CLINIC | Age: 32
End: 2022-11-11

## 2022-11-11 NOTE — PSYCH
Psychiatric Discharge Summary     Admit Date: 07/14/21  Discharge Date: 11/11/22    Discharge Diagnosis:  Generalized anxiety disorder  Panic disorder with agoraphobia    Treating Physician: Kori Jha MD    Prognosis at time of discharge: Excellent     Presenting Problems/Pertinent Findings:   Pt is a 31 y/o F, single, PPH of panic attacks, no prior U admission or SA, who initially reported with worsening symptoms of anxiety/panic attacks and depression, in the setting of struggling with being single without children (not normal in her culture at her age)      Pt had good results with Paxil, but weaned off in March, and is hesitant to be resumed on any standing medications  She does utilize xanax and klonopin PRN for flying  She now reports stable mood and denies any current SI  Therapist: None    Course of Treatment: Psychiatric Evaluation and Medication Management with Psychotherapy      To what extent did the consumer achieve their goals? All    Criteria for Discharge: Have completed tx goals and objectives and are no longer in need of services    Aftercare Recommendations:  Follow up with pcp    Discharge Medications:   Current Outpatient Medications:   •  albuterol (Ventolin HFA) 90 mcg/act inhaler, Inhale 2 puffs every 6 (six) hours as needed for wheezing, Disp: 18 g, Rfl: 0  •  ALPRAZolam (XANAX) 1 mg tablet, Take 1 tablet 1 hour prior to flight, Disp: 20 tablet, Rfl: 0  •  clonazePAM (KlonoPIN) 1 mg tablet, Take 1 tablet (1 mg total) by mouth 2 (two) times a day as needed for anxiety for up to 5 days, Disp: 10 tablet, Rfl: 0  •  D3 Super Strength 50 MCG (2000 UT) CAPS, TAKE 1 CAPSULE BY MOUTH EVERY DAY, Disp: 90 capsule, Rfl: 0  •  fluticasone (FLONASE) 50 mcg/act nasal spray, SPRAY 2 SPRAYS INTO EACH NOSTRIL EVERY DAY, Disp: 48 mL, Rfl: 1  •  loratadine (CLARITIN) 10 mg tablet, TAKE 1 TABLET BY MOUTH EVERY DAY, Disp: 30 tablet, Rfl: 2     Describe consumers ability and willingness to work and solve her mental problem  Pt was dedicated and engaged in treating her mental health issues  Family Psychiatric History:   Mom: depression, suicide attempt (2007)  Sister: suicide attempt         Past Psychiatric History:      Past Inpatient Psychiatric Treatment:   No history of past inpatient psychiatric admissions  Past Outpatient Psychiatric Treatment:    No history of past outpatient psychiatric treatment  Currently in therapy with Patty Diaz at [facility] since [facility]  Past Suicide Attempts: no  Past Violent Behavior: no  Past Psychiatric Medication Trials: Xanax           Substance Abuse History:     No history of ETOH, illict substance, or tobacco abuse  No past legal actions or arrests secondary to substance intoxication  The patient denies prior DWIs/DUIs  No history of outpatient/inpatient rehabilitation programs  Liudmila Serra does not exhibit objective evidence of substance withdrawal during today's examination nor does Liudmila Serra appear under the influence of any psychoactive substance           Social History:     Developmental: Denies a history of milestone/developmental delay  Denies a history of in-utero exposure to toxins/illicit substances  There is no documented history of IEP or need for special education  Migrated from [place] at age 13  Education: Masters in Education  Marital history: single  Living arrangement, social support: parents, strained relationship with sister  Occupational History:  [employer], [employer] on the side  Access to firearms: Denies direct access to weapons/firearms  Neha Rao has no history of arrests or violence with a deadly weapon  Hx of  service: denies  Prior incarcerations or legal issues: denies     Traumatic History:      Abuse:none is reported  Other Traumatic Events: Mother's suicide attempt after father's affair   Ex-boyfriend cheating on her after 3 years in 2011        The following portions of the patient's history were reviewed and updated as appropriate: allergies, current medications, past family history, past medical history, past social history, past surgical history and problem list       Mental Status at Time of most recent visit on 06/14/22  Mental status:  Appearance sitting comfortably in chair, dressed in casual clothing, adequate hygiene and grooming, cooperative with interview, good eye contact   Mood "good"   Affect Appears generally euthymic, stable, mood-congruent   Speech Normal rate, rhythm, and volume   Thought Processes Linear and goal directed   Associations intact associations   Hallucinations Denies any auditory or visual hallucinations   Thought Content No passive or active suicidal or homicidal ideation, intent, or plan     Orientation Oriented to person, place, time, and situation   Recent and Remote Memory Grossly intact   Attention Span and Concentration Concentration intact   Intellect Appears to be of Average Intelligence   Insight Insight intact   Judgement judgment was intact   Muscle Strength Muscle strength and tone were normal   Language Within normal limits   Fund of Knowledge Age appropriate   Pain None

## 2022-11-11 NOTE — TELEPHONE ENCOUNTER
Spoke with patient, who states she's stable and doing well, and was agreeable with the plan for discharge  She denies any suicidal ideations, intent or plan  Pt will continue to follow with her PCP for her medications as needed

## 2022-11-14 ENCOUNTER — TELEPHONE (OUTPATIENT)
Dept: PSYCHIATRY | Facility: CLINIC | Age: 32
End: 2022-11-14

## 2022-11-14 NOTE — TELEPHONE ENCOUNTER
DISCHARGE LETTER for Dr Caron De La Torre (certified and regular) placed in outgoing mail on 11/14/22      Article #:  [id number]    Address:  [address] [place], [place] [id number]

## 2022-11-17 ENCOUNTER — VBI (OUTPATIENT)
Dept: ADMINISTRATIVE | Facility: OTHER | Age: 32
End: 2022-11-17

## 2023-01-08 ENCOUNTER — VBI (OUTPATIENT)
Dept: ADMINISTRATIVE | Facility: OTHER | Age: 33
End: 2023-01-08

## 2023-02-28 DIAGNOSIS — F40.01 PANIC DISORDER WITH AGORAPHOBIA: ICD-10-CM

## 2023-02-28 RX ORDER — ALPRAZOLAM 1 MG/1
TABLET ORAL
Qty: 20 TABLET | Refills: 0 | Status: SHIPPED | OUTPATIENT
Start: 2023-02-28

## 2023-03-03 ENCOUNTER — VBI (OUTPATIENT)
Dept: ADMINISTRATIVE | Facility: OTHER | Age: 33
End: 2023-03-03

## 2023-04-03 ENCOUNTER — TELEPHONE (OUTPATIENT)
Dept: FAMILY MEDICINE CLINIC | Facility: CLINIC | Age: 33
End: 2023-04-03

## 2023-04-03 DIAGNOSIS — E55.9 VITAMIN D DEFICIENCY: ICD-10-CM

## 2023-04-03 RX ORDER — CHOLECALCIFEROL (VITAMIN D3) 50 MCG
1 CAPSULE ORAL DAILY
Qty: 90 CAPSULE | Refills: 0 | Status: CANCELLED | OUTPATIENT
Start: 2023-04-03

## 2023-04-03 RX ORDER — CHOLECALCIFEROL (VITAMIN D3) 50 MCG
1 CAPSULE ORAL DAILY
Qty: 90 CAPSULE | Refills: 0 | Status: SHIPPED | OUTPATIENT
Start: 2023-04-03

## 2023-04-03 NOTE — TELEPHONE ENCOUNTER
Patient has been feeling a lot of fatigue recently and wanted to know if there are any blood work she can do and then make a f/u appointment with you  Please advise

## 2023-04-25 ENCOUNTER — VBI (OUTPATIENT)
Dept: ADMINISTRATIVE | Facility: OTHER | Age: 33
End: 2023-04-25

## 2023-07-03 DIAGNOSIS — E55.9 VITAMIN D DEFICIENCY: ICD-10-CM

## 2023-07-03 RX ORDER — ACETAMINOPHEN 160 MG
TABLET,DISINTEGRATING ORAL
Qty: 90 CAPSULE | Refills: 0 | Status: SHIPPED | OUTPATIENT
Start: 2023-07-03

## 2023-07-18 ENCOUNTER — OFFICE VISIT (OUTPATIENT)
Dept: FAMILY MEDICINE CLINIC | Facility: CLINIC | Age: 33
End: 2023-07-18
Payer: COMMERCIAL

## 2023-07-18 VITALS
OXYGEN SATURATION: 99 % | SYSTOLIC BLOOD PRESSURE: 110 MMHG | BODY MASS INDEX: 23.04 KG/M2 | WEIGHT: 130 LBS | HEART RATE: 86 BPM | DIASTOLIC BLOOD PRESSURE: 60 MMHG | TEMPERATURE: 99.3 F | HEIGHT: 63 IN

## 2023-07-18 DIAGNOSIS — F41.1 GENERALIZED ANXIETY DISORDER: ICD-10-CM

## 2023-07-18 DIAGNOSIS — Z13.29 SCREENING FOR THYROID DISORDER: ICD-10-CM

## 2023-07-18 DIAGNOSIS — Z00.01 ENCOUNTER FOR ROUTINE ADULT PHYSICAL EXAM WITH ABNORMAL FINDINGS: Primary | ICD-10-CM

## 2023-07-18 DIAGNOSIS — R53.83 OTHER FATIGUE: ICD-10-CM

## 2023-07-18 DIAGNOSIS — E55.9 VITAMIN D DEFICIENCY: ICD-10-CM

## 2023-07-18 DIAGNOSIS — Z13.220 SCREENING FOR LIPID DISORDERS: ICD-10-CM

## 2023-07-18 DIAGNOSIS — F33.9 DEPRESSION, RECURRENT (HCC): ICD-10-CM

## 2023-07-18 PROBLEM — J06.9 UPPER RESPIRATORY TRACT INFECTION: Status: RESOLVED | Noted: 2022-01-05 | Resolved: 2023-07-18

## 2023-07-18 PROCEDURE — 99214 OFFICE O/P EST MOD 30 MIN: CPT | Performed by: FAMILY MEDICINE

## 2023-07-18 PROCEDURE — 99395 PREV VISIT EST AGE 18-39: CPT | Performed by: FAMILY MEDICINE

## 2023-07-18 NOTE — PROGRESS NOTES
ADULT ANNUAL PHYSICAL  [address] [facility] PRIMARY CARE [facility]    NAME: Brian Pacheco  AGE: 35 y.o. SEX: female  : 1990     DATE: 2023     Assessment and Plan:     Problem List Items Addressed This Visit        Other    Vitamin D deficiency    Relevant Orders    CBC and differential (Completed)    Comprehensive metabolic panel (Completed)    Lyme Disease Serology w/Reflex    Vitamin D 25 hydroxy (Completed)    Generalized anxiety disorder     Chronic symptomatic discussed with patient starting Prozac 10 mg once a day and she declined and she will think about it         Relevant Orders    CBC and differential (Completed)    Comprehensive metabolic panel (Completed)    Encounter for routine adult physical exam with abnormal findings - Primary     Advice and education were given regarding nutrition, aerobic exercises, weight-bearing exercises, cardiovascular risk reduction, fall risk reduction, and age-appropriate supplements. The patient was counseled regarding instructions for management, risk factor reductions, prognosis, risks and benefits of treatment options, patient and family education, and importance of compliance with treatment.           Relevant Orders    CBC and differential (Completed)    Comprehensive metabolic panel (Completed)    Depression, recurrent (HCC)     Chronic symptomatic discussed with patient starting Prozac 10 mg once a day and she declined and she will think about it         Relevant Orders    CBC and differential (Completed)    Comprehensive metabolic panel (Completed)    Vitamin B12 (Completed)    Other fatigue     Symptomatic discuss it can be  multifactorial discussed patient with history of vitamin D deficiency she is anxious not sleeping well recommend to do work-up to rule out anemia rule out thyroid problem discussed sleeping hygiene         Relevant Orders    CBC and differential (Completed)    Comprehensive metabolic panel (Completed)    Lyme Disease Serology w/Reflex   Other Visit Diagnoses     Screening for lipid disorders        Relevant Orders    Lipid Panel with Direct LDL reflex (Completed)    Screening for thyroid disorder        Relevant Orders    TSH, 3rd generation with Free T4 reflex (Completed)          Immunizations and preventive care screenings were discussed with patient today. Appropriate education was printed on patient's after visit summary. Counseling:  Alcohol/drug use: discussed moderation in alcohol intake, the recommendations for healthy alcohol use, and avoidance of illicit drug use. Dental Health: discussed importance of regular tooth brushing, flossing, and dental visits. Injury prevention: discussed safety/seat belts, safety helmets, smoke detectors, carbon dioxide detectors, and smoking near bedding or upholstery. Sexual health: discussed sexually transmitted diseases, partner selection, use of condoms, avoidance of unintended pregnancy, and contraceptive alternatives. Exercise: the importance of regular exercise/physical activity was discussed. Recommend exercise 3-5 times per week for at least 30 minutes. No follow-ups on file. Chief Complaint:     Chief Complaint   Patient presents with   • Physical Exam      History of Present Illness:     Adult Annual Physical   Patient here for a comprehensive physical exam. The patient reports problems - Patient feels tired and exhausted at the end of this is a problem on the office despite she is sleeping well she can get in for therapy she has a history of depression anxiety currently not on any medication and also patient has history of vitamin D deficiency no recent blood work. Diet and Physical Activity  Diet/Nutrition: no special diet. Exercise: no formal exercise.     Patient:  Depression Screening  PHQ-2/9 Depression Screening    Little interest or pleasure in doing things: 2 - more than half the days  Feeling down, depressed, or hopeless: 1 - several days  Trouble falling or staying asleep, or sleeping too much: 0 - not at all  Feeling tired or having little energy: 3 - nearly every day  Poor appetite or overeatin - not at all  Feeling bad about yourself - or that you are a failure or have let yourself or your family down: 0 - not at all  Trouble concentrating on things, such as reading the newspaper or watching television: 2 - more than half the days  Moving or speaking so slowly that other people could have noticed. Or the opposite - being so fidgety or restless that you have been moving around a lot more than usual: 0 - not at all  Thoughts that you would be better off dead, or of hurting yourself in some way: 0 - not at all  PHQ-9 Score: 8   PHQ-9 Interpretation: Mild depression        General Health  Sleep: sleeps well. Hearing: normal - bilateral.  Vision: previous LASIK surgery. Dental: regular dental visits. /GYN Health  Contraceptive method: NONE. History of STDs?: no.     Review of Systems:     Review of Systems   Constitutional: Negative for chills and fever. HENT: Negative for ear pain and sore throat. Eyes: Negative for pain and visual disturbance. Respiratory: Negative for cough and shortness of breath. Cardiovascular: Negative for chest pain and palpitations. Gastrointestinal: Negative for abdominal pain, blood in stool, constipation, diarrhea and vomiting. Genitourinary: Negative for dysuria and hematuria. Musculoskeletal: Negative for arthralgias and back pain. Skin: Negative for color change and rash. Neurological: Negative for seizures and syncope. Hematological: Does not bruise/bleed easily. Psychiatric/Behavioral: The patient is nervous/anxious. All other systems reviewed and are negative.      Past Medical History:     Past Medical History:   Diagnosis Date   • Chickenpox    • COVID-19 virus infection 3/23/2021   • Depression 2021   • Encounter for well adult exam with abnormal findings 6/5/2020   • Exposure to COVID-19 virus 11/12/2020   • Exposure to influenza 3/23/2022   • Flu-like symptoms 3/23/2022   • HPV vaccine counseling     never had   • Lump 9/12/2019   • Lymphadenitis 9/17/2021   • Other fatigue 6/5/2020   • Other viral warts 6/8/2019   • Panic attack 6/17/2019   • Positive depression screening 2/5/2019   • Scalp cyst 11/22/2019    removed   • Upper respiratory tract infection 1/5/2022   • Viral upper respiratory tract infection 1/5/2022      Past Surgical History:     Past Surgical History:   Procedure Laterality Date   • LASIK Bilateral 01/19/2019   • WISDOM TOOTH EXTRACTION        Social History:     Social History     Socioeconomic History   • Marital status: Single     Spouse name: None   • Number of children: 0   • Years of education: None   • Highest education level: Master's degree (e.g., MA, MS, Hugh, MEd, MSW, CRAIG)   Occupational History   • Occupation: teacher     Comment: [employer]   Tobacco Use   • Smoking status: Never   • Smokeless tobacco: Never   Vaping Use   • Vaping Use: Never used   Substance and Sexual Activity   • Alcohol use: Not Currently     Alcohol/week: 0.0 - 1.0 standard drinks of alcohol     Comment: holidays   • Drug use: No   • Sexual activity: Never   Other Topics Concern   • None   Social History Narrative    Protestant: Uzbek Moravian    Accepts blood products     Social Determinants of Health     Financial Resource Strain: Low Risk  (7/7/2021)    Overall Financial Resource Strain (CARDIA)    • Difficulty of Paying Living Expenses: Not hard at all   Food Insecurity: No Food Insecurity (7/8/2022)    Hunger Vital Sign    • Worried About Running Out of Food in the Last Year: Never true    • Ran Out of Food in the Last Year: Never true   Transportation Needs: No Transportation Needs (7/7/2021)    PRAPARE - Transportation    • Lack of Transportation (Medical): No    • Lack of Transportation (Non-Medical):  No   Physical Activity: Insufficiently Active (7/8/2022)    Exercise Vital Sign    • Days of Exercise per Week: 3 days    • Minutes of Exercise per Session: 30 min   Stress: Stress Concern Present (7/7/2021)    [facility]    • Feeling of Stress : Rather much   Social Connections: Moderately Isolated (7/8/2022)    Social Connection and Isolation Panel [NHANES]    • Frequency of Communication with Friends and Family: More than three times a week    • Frequency of Social Gatherings with Friends and Family:  Three times a week    • Attends Jewish Services: More than 4 times per year    • Active Member of Clubs or Organizations: No    • Attends Club or Organization Meetings: Never    • Marital Status: Never    Intimate Partner Violence: Not At Risk (7/8/2022)    Humiliation, Afraid, Rape, and Kick questionnaire    • Fear of Current or Ex-Partner: No    • Emotionally Abused: No    • Physically Abused: No    • Sexually Abused: No   Housing Stability: Unknown (7/8/2022)    Housing Stability Vital Sign    • Unable to Pay for Housing in the Last Year: No    • Number of [address] in the Last Year: Not on file    • Unstable Housing in the Last Year: No      Family History:     Family History   Problem Relation Age of Onset   • Stroke Father    • Sarcoidosis Mother    • No Known Problems Sister    • Diabetes Maternal Grandmother    • Hypertension Maternal Grandmother    • Diabetes Maternal Grandfather    • Hypertension Maternal Grandfather    • Stomach cancer Paternal Grandmother    • Breast cancer Neg Hx    • Ovarian cancer Neg Hx    • Colon cancer Neg Hx       Current Medications:     Current Outpatient Medications   Medication Sig Dispense Refill   • albuterol (Ventolin HFA) 90 mcg/act inhaler Inhale 2 puffs every 6 (six) hours as needed for wheezing 18 g 0   • ALPRAZolam (XANAX) 1 mg tablet Take 1 tablet 1 hour prior to flight 20 tablet 0   • Cholecalciferol (Vitamin D3) 50 MCG (2000 UT) capsule TAKE 1 CAPSULE BY MOUTH EVERY DAY 90 capsule 0   • fluticasone (FLONASE) 50 mcg/act nasal spray SPRAY 2 SPRAYS INTO EACH NOSTRIL EVERY DAY 48 mL 1   • loratadine (CLARITIN) 10 mg tablet TAKE 1 TABLET BY MOUTH EVERY DAY 30 tablet 2     No current facility-administered medications for this visit. Allergies:     No Known Allergies   Physical Exam:     /60 (BP Location: Left arm, Patient Position: Sitting)   Pulse 86   Temp 99.3 °F (37.4 °C) (Tympanic)   Ht 5' 3.25" (1.607 m)   Wt 59 kg (130 lb)   LMP 07/01/2023 (Exact Date)   SpO2 99%   Breastfeeding No   BMI 22.85 kg/m²     Physical Exam  Vitals and nursing note reviewed. Constitutional:       General: She is not in acute distress. Appearance: She is well-developed. HENT:      Head: Normocephalic and atraumatic. Right Ear: Tympanic membrane normal. There is no impacted cerumen. Left Ear: Tympanic membrane normal. There is no impacted cerumen. Nose: No congestion or rhinorrhea. Mouth/Throat:      Pharynx: No oropharyngeal exudate or posterior oropharyngeal erythema. Eyes:      General:         Right eye: No discharge. Left eye: No discharge. Conjunctiva/sclera: Conjunctivae normal.   Cardiovascular:      Rate and Rhythm: Normal rate and regular rhythm. Heart sounds: No murmur heard. Pulmonary:      Effort: Pulmonary effort is normal. No respiratory distress. Breath sounds: Normal breath sounds. Abdominal:      Palpations: Abdomen is soft. Tenderness: There is no abdominal tenderness. Musculoskeletal:         General: No swelling. Cervical back: Neck supple. Skin:     General: Skin is warm and dry. Capillary Refill: Capillary refill takes less than 2 seconds. Findings: No erythema or rash. Neurological:      Mental Status: She is alert and oriented to person, place, and time.    Psychiatric:         Mood and Affect: Mood normal.          [facility] Rodolfo Walsh MD   [address]

## 2023-07-19 ENCOUNTER — APPOINTMENT (OUTPATIENT)
Dept: LAB | Age: 33
End: 2023-07-19
Payer: COMMERCIAL

## 2023-07-19 DIAGNOSIS — Z13.220 SCREENING FOR LIPID DISORDERS: ICD-10-CM

## 2023-07-19 DIAGNOSIS — F41.1 GENERALIZED ANXIETY DISORDER: ICD-10-CM

## 2023-07-19 DIAGNOSIS — E55.9 VITAMIN D DEFICIENCY: ICD-10-CM

## 2023-07-19 DIAGNOSIS — F33.9 DEPRESSION, RECURRENT (HCC): ICD-10-CM

## 2023-07-19 DIAGNOSIS — Z13.29 SCREENING FOR THYROID DISORDER: ICD-10-CM

## 2023-07-19 DIAGNOSIS — R53.83 OTHER FATIGUE: ICD-10-CM

## 2023-07-19 DIAGNOSIS — Z00.01 ENCOUNTER FOR ROUTINE ADULT PHYSICAL EXAM WITH ABNORMAL FINDINGS: ICD-10-CM

## 2023-07-19 LAB
25(OH)D3 SERPL-MCNC: 55.3 NG/ML (ref 30–100)
ALBUMIN SERPL BCP-MCNC: 3.7 G/DL (ref 3.5–5)
ALP SERPL-CCNC: 58 U/L (ref 46–116)
ALT SERPL W P-5'-P-CCNC: 19 U/L (ref 12–78)
ANION GAP SERPL CALCULATED.3IONS-SCNC: 6 MMOL/L
AST SERPL W P-5'-P-CCNC: 11 U/L (ref 5–45)
B BURGDOR IGG+IGM SER QL IA: NEGATIVE
BASOPHILS # BLD AUTO: 0.02 THOUSANDS/ÂΜL (ref 0–0.1)
BASOPHILS NFR BLD AUTO: 0 % (ref 0–1)
BILIRUB SERPL-MCNC: 0.32 MG/DL (ref 0.2–1)
BUN SERPL-MCNC: 10 MG/DL (ref 5–25)
CALCIUM SERPL-MCNC: 9.2 MG/DL (ref 8.3–10.1)
CHLORIDE SERPL-SCNC: 109 MMOL/L (ref 96–108)
CHOLEST SERPL-MCNC: 156 MG/DL
CO2 SERPL-SCNC: 23 MMOL/L (ref 21–32)
CREAT SERPL-MCNC: 0.74 MG/DL (ref 0.6–1.3)
EOSINOPHIL # BLD AUTO: 0.12 THOUSAND/ÂΜL (ref 0–0.61)
EOSINOPHIL NFR BLD AUTO: 2 % (ref 0–6)
ERYTHROCYTE [DISTWIDTH] IN BLOOD BY AUTOMATED COUNT: 12.4 % (ref 11.6–15.1)
GFR SERPL CREATININE-BSD FRML MDRD: 106 ML/MIN/1.73SQ M
GLUCOSE P FAST SERPL-MCNC: 92 MG/DL (ref 65–99)
HCT VFR BLD AUTO: 40.6 % (ref 34.8–46.1)
HDLC SERPL-MCNC: 57 MG/DL
HGB BLD-MCNC: 13.4 G/DL (ref 11.5–15.4)
IMM GRANULOCYTES # BLD AUTO: 0.02 THOUSAND/UL (ref 0–0.2)
IMM GRANULOCYTES NFR BLD AUTO: 0 % (ref 0–2)
LDLC SERPL CALC-MCNC: 85 MG/DL (ref 0–100)
LYMPHOCYTES # BLD AUTO: 1.58 THOUSANDS/ÂΜL (ref 0.6–4.47)
LYMPHOCYTES NFR BLD AUTO: 24 % (ref 14–44)
MCH RBC QN AUTO: 30.5 PG (ref 26.8–34.3)
MCHC RBC AUTO-ENTMCNC: 33 G/DL (ref 31.4–37.4)
MCV RBC AUTO: 92 FL (ref 82–98)
MONOCYTES # BLD AUTO: 0.46 THOUSAND/ÂΜL (ref 0.17–1.22)
MONOCYTES NFR BLD AUTO: 7 % (ref 4–12)
NEUTROPHILS # BLD AUTO: 4.5 THOUSANDS/ÂΜL (ref 1.85–7.62)
NEUTS SEG NFR BLD AUTO: 67 % (ref 43–75)
NRBC BLD AUTO-RTO: 0 /100 WBCS
PLATELET # BLD AUTO: 235 THOUSANDS/UL (ref 149–390)
PMV BLD AUTO: 10.6 FL (ref 8.9–12.7)
POTASSIUM SERPL-SCNC: 4 MMOL/L (ref 3.5–5.3)
PROT SERPL-MCNC: 7.3 G/DL (ref 6.4–8.4)
RBC # BLD AUTO: 4.4 MILLION/UL (ref 3.81–5.12)
SODIUM SERPL-SCNC: 138 MMOL/L (ref 135–147)
TRIGL SERPL-MCNC: 70 MG/DL
TSH SERPL DL<=0.05 MIU/L-ACNC: 2.19 UIU/ML (ref 0.45–4.5)
VIT B12 SERPL-MCNC: 384 PG/ML (ref 180–914)
WBC # BLD AUTO: 6.7 THOUSAND/UL (ref 4.31–10.16)

## 2023-07-19 PROCEDURE — 82306 VITAMIN D 25 HYDROXY: CPT

## 2023-07-19 PROCEDURE — 82607 VITAMIN B-12: CPT

## 2023-07-19 PROCEDURE — 80053 COMPREHEN METABOLIC PANEL: CPT

## 2023-07-19 PROCEDURE — 36415 COLL VENOUS BLD VENIPUNCTURE: CPT

## 2023-07-19 PROCEDURE — 85025 COMPLETE CBC W/AUTO DIFF WBC: CPT

## 2023-07-19 PROCEDURE — 84443 ASSAY THYROID STIM HORMONE: CPT

## 2023-07-19 PROCEDURE — 86618 LYME DISEASE ANTIBODY: CPT

## 2023-07-19 PROCEDURE — 80061 LIPID PANEL: CPT

## 2023-07-19 NOTE — ASSESSMENT & PLAN NOTE
Symptomatic discuss it can be  multifactorial discussed patient with history of vitamin D deficiency she is anxious not sleeping well recommend to do work-up to rule out anemia rule out thyroid problem discussed sleeping hygiene

## 2023-07-19 NOTE — ASSESSMENT & PLAN NOTE
Chronic symptomatic discussed with patient starting Prozac 10 mg once a day and she declined and she will think about it

## 2023-07-20 ENCOUNTER — TELEPHONE (OUTPATIENT)
Dept: FAMILY MEDICINE CLINIC | Facility: CLINIC | Age: 33
End: 2023-07-20

## 2023-07-20 NOTE — TELEPHONE ENCOUNTER
Patient called in requesting lab results. Patient states she has sen her results online and is concerned for the abnormal readings that there are. Please advise.

## 2023-07-26 ENCOUNTER — VBI (OUTPATIENT)
Dept: ADMINISTRATIVE | Facility: OTHER | Age: 33
End: 2023-07-26

## 2023-08-02 ENCOUNTER — RA CDI HCC (OUTPATIENT)
Dept: OTHER | Facility: HOSPITAL | Age: 33
End: 2023-08-02

## 2023-08-14 ENCOUNTER — OFFICE VISIT (OUTPATIENT)
Dept: FAMILY MEDICINE CLINIC | Facility: CLINIC | Age: 33
End: 2023-08-14
Payer: COMMERCIAL

## 2023-08-14 VITALS
OXYGEN SATURATION: 98 % | SYSTOLIC BLOOD PRESSURE: 110 MMHG | TEMPERATURE: 98.4 F | HEART RATE: 92 BPM | WEIGHT: 129 LBS | DIASTOLIC BLOOD PRESSURE: 60 MMHG | HEIGHT: 64 IN | BODY MASS INDEX: 22.02 KG/M2

## 2023-08-14 DIAGNOSIS — K58.0 IRRITABLE BOWEL SYNDROME WITH DIARRHEA: ICD-10-CM

## 2023-08-14 DIAGNOSIS — E55.9 VITAMIN D DEFICIENCY: Primary | ICD-10-CM

## 2023-08-14 DIAGNOSIS — J30.89 SEASONAL ALLERGIC RHINITIS DUE TO OTHER ALLERGIC TRIGGER: ICD-10-CM

## 2023-08-14 PROCEDURE — 99214 OFFICE O/P EST MOD 30 MIN: CPT | Performed by: FAMILY MEDICINE

## 2023-08-14 NOTE — PROGRESS NOTES
Name: Willis Gonsales      : 1990      MRN: [id number]  Encounter Provider: Vance Short MD  Encounter Date: 2023   Encounter department: [address] [place] [address]     1. Vitamin D deficiency  Assessment & Plan:  Chronic uncontrolled recommended patient to start taking vitamin D 2000 IU once a day vitamin D rich diet discussed with the patient      2. Seasonal allergic rhinitis due to other allergic trigger  Assessment & Plan:  Chronic fair control patient continue to take loratadine on as needed basis      3. Irritable bowel syndrome with diarrhea  Assessment & Plan:  Chronic stable diet managed with the diet             Subjective      Patient here follow-up with a chronic condition recent blood work discussed with the patient no new concerns    Review of Systems   Constitutional: Negative for chills and fever. HENT: Negative for ear pain and sore throat. Eyes: Negative for pain and visual disturbance. Respiratory: Negative for cough and shortness of breath. Cardiovascular: Negative for chest pain and palpitations. Gastrointestinal: Negative for abdominal pain, constipation, diarrhea and vomiting. Genitourinary: Negative for dysuria and hematuria. Musculoskeletal: Negative for arthralgias and back pain. Skin: Negative for color change and rash. Neurological: Negative for seizures and syncope. All other systems reviewed and are negative.       Current Outpatient Medications on File Prior to Visit   Medication Sig   • albuterol (Ventolin HFA) 90 mcg/act inhaler Inhale 2 puffs every 6 (six) hours as needed for wheezing   • ALPRAZolam (XANAX) 1 mg tablet Take 1 tablet 1 hour prior to flight   • Cholecalciferol (Vitamin D3) 50 MCG (2000 UT) capsule TAKE 1 CAPSULE BY MOUTH EVERY DAY   • fluticasone (FLONASE) 50 mcg/act nasal spray SPRAY 2 SPRAYS INTO EACH NOSTRIL EVERY DAY   • loratadine (CLARITIN) 10 mg tablet TAKE 1 TABLET BY MOUTH EVERY DAY Objective     /60 (BP Location: Left arm, Patient Position: Sitting)   Pulse 92   Temp 98.4 °F (36.9 °C) (Tympanic)   Ht 5' 3.5" (1.613 m)   Wt 58.5 kg (129 lb)   LMP 07/25/2023 (Exact Date)   SpO2 98%   Breastfeeding No   BMI 22.49 kg/m²     Physical Exam  Vitals and nursing note reviewed. Constitutional:       General: She is not in acute distress. Appearance: She is well-developed. She is not diaphoretic. HENT:      Head: Normocephalic. Right Ear: External ear normal.      Left Ear: External ear normal.      Nose: No rhinorrhea. Mouth/Throat:      Pharynx: No posterior oropharyngeal erythema. Eyes:      General:         Right eye: No discharge. Left eye: No discharge. Conjunctiva/sclera: Conjunctivae normal.   Neck:      Vascular: No JVD. Cardiovascular:      Rate and Rhythm: Normal rate and regular rhythm. Heart sounds: Normal heart sounds. No murmur heard. No gallop. Pulmonary:      Effort: Pulmonary effort is normal. No respiratory distress. Breath sounds: Normal breath sounds. No stridor. No wheezing or rales. Chest:      Chest wall: No tenderness. Abdominal:      General: There is no distension. Palpations: Abdomen is soft. There is no mass. Tenderness: There is no abdominal tenderness. There is no rebound. Musculoskeletal:         General: No tenderness. Cervical back: Normal range of motion and neck supple. Lymphadenopathy:      Cervical: No cervical adenopathy. Skin:     General: Skin is warm. Findings: No erythema or rash. Neurological:      Mental Status: She is alert and oriented to person, place, and time.        Vance Short MD

## 2023-08-16 NOTE — ASSESSMENT & PLAN NOTE
Chronic uncontrolled recommended patient to start taking vitamin D 2000 IU once a day vitamin D rich diet discussed with the patient

## 2023-10-06 DIAGNOSIS — E55.9 VITAMIN D DEFICIENCY: ICD-10-CM

## 2023-10-06 RX ORDER — ACETAMINOPHEN 160 MG
TABLET,DISINTEGRATING ORAL
Qty: 90 CAPSULE | Refills: 0 | Status: SHIPPED | OUTPATIENT
Start: 2023-10-06

## 2023-11-20 ENCOUNTER — OFFICE VISIT (OUTPATIENT)
Dept: URGENT CARE | Age: 33
End: 2023-11-20
Payer: COMMERCIAL

## 2023-11-20 VITALS
SYSTOLIC BLOOD PRESSURE: 122 MMHG | OXYGEN SATURATION: 99 % | TEMPERATURE: 98.9 F | HEART RATE: 89 BPM | DIASTOLIC BLOOD PRESSURE: 78 MMHG | RESPIRATION RATE: 18 BRPM

## 2023-11-20 DIAGNOSIS — B34.9 ACUTE VIRAL SYNDROME: Primary | ICD-10-CM

## 2023-11-20 DIAGNOSIS — R52 BODY ACHES: ICD-10-CM

## 2023-11-20 DIAGNOSIS — J02.9 SORE THROAT: ICD-10-CM

## 2023-11-20 LAB
S PYO AG THROAT QL: NEGATIVE
SARS-COV-2 AG UPPER RESP QL IA: NEGATIVE
VALID CONTROL: NORMAL

## 2023-11-20 PROCEDURE — 87636 SARSCOV2 & INF A&B AMP PRB: CPT

## 2023-11-20 PROCEDURE — 87880 STREP A ASSAY W/OPTIC: CPT

## 2023-11-20 PROCEDURE — 87811 SARS-COV-2 COVID19 W/OPTIC: CPT

## 2023-11-20 PROCEDURE — 99213 OFFICE O/P EST LOW 20 MIN: CPT

## 2023-11-20 NOTE — PATIENT INSTRUCTIONS
Vitamin D3 2000 IU daily  Vitamin C 1000mg twice per day  Multivitamin daily  Fluids and rest  Ibuprofen 600-800mg + Tylenol 1000mg every 6 hours provided you do not have a history of kidney or liver dysfunction. Do not exceed this amount. Over the counter cold medication as needed (EX: Coricidin HBP, tylenol/motrin)  Follow up with PCP in 3-5 days. Proceed to ER if symptoms worsen.

## 2023-11-20 NOTE — PROGRESS NOTES
[facility]        NAME: Amy Fabian is a 35 y.o. female  : 1990    MRN: [id number]  DATE: 2023  TIME: 1:17 PM    Assessment and Plan   Acute viral syndrome [B34.9]  1. Acute viral syndrome        2. Body aches  Poct Covid 19 Rapid Antigen Test    Covid/Flu-Office Collect      3. Sore throat  POCT rapid strepA          POCT rapid covid: Negative    POCT rapid strep: Negative    Patient Instructions     Vitamin D3 2000 IU daily  Vitamin C 1000mg twice per day  Multivitamin daily  Fluids and rest  Ibuprofen 600-800mg + Tylenol 1000mg every 6 hours provided you do not have a history of kidney or liver dysfunction. Do not exceed this amount. Over the counter cold medication as needed (EX: Coricidin HBP, tylenol/motrin)  Follow up with PCP in 3-5 days. Proceed to  ER if symptoms worsen. Chief Complaint     Chief Complaint   Patient presents with    Cold Like Symptoms     Body aches, high fever yesterday, sore throat. Started late last week. History of Present Illness       Patient is a 34 yo female with no significant PMH presenting in the clinic today for cold sx x 3 days. Admits fever, chills, fatigue, body aches, sore throat, and postnasal drip. Admits intermittent right sided thoracic back pain/spasm. Denies cough, headache, congestion, rhinorrhea, chest pain, SOB, abdominal pain, dysuria, hematuria, n/v/d. Admits the use of tylenol for sx management. 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 Review of Systems   Review of Systems   Constitutional:  Positive for fever. Negative for chills and fatigue. HENT:  Positive for postnasal drip and sore throat. Negative for congestion, ear pain, rhinorrhea, sinus pressure and sinus pain. Respiratory:  Negative for cough and shortness of breath. Cardiovascular:  Negative for chest pain. Gastrointestinal:  Negative for abdominal pain, diarrhea, nausea and vomiting.    Musculoskeletal: Positive for myalgias. Skin:  Negative for rash. Neurological:  Negative for headaches.          Current Medications       Current Outpatient Medications:     albuterol (Ventolin HFA) 90 mcg/act inhaler, Inhale 2 puffs every 6 (six) hours as needed for wheezing, Disp: 18 g, Rfl: 0    Cholecalciferol (Vitamin D3) 50 MCG (2000 UT) capsule, TAKE 1 CAPSULE BY MOUTH EVERY DAY, Disp: 90 capsule, Rfl: 0    fluticasone (FLONASE) 50 mcg/act nasal spray, SPRAY 2 SPRAYS INTO EACH NOSTRIL EVERY DAY, Disp: 48 mL, Rfl: 1    loratadine (CLARITIN) 10 mg tablet, TAKE 1 TABLET BY MOUTH EVERY DAY, Disp: 30 tablet, Rfl: 2    ALPRAZolam (XANAX) 1 mg tablet, Take 1 tablet 1 hour prior to flight (Patient not taking: Reported on 11/20/2023), Disp: 20 tablet, Rfl: 0    Current Allergies     Allergies as of 11/20/2023    (No Known Allergies)            The following portions of the patient's history were reviewed and updated as appropriate: allergies, current medications, past family history, past medical history, past social history, past surgical history and problem list.     Past Medical History:   Diagnosis Date    Chickenpox     COVID-19 virus infection 3/23/2021    Depression 7/7/2021    Encounter for well adult exam with abnormal findings 6/5/2020    Exposure to COVID-19 virus 11/12/2020    Exposure to influenza 3/23/2022    Flu-like symptoms 3/23/2022    HPV vaccine counseling     never had    Lump 9/12/2019    Lymphadenitis 9/17/2021    Other fatigue 6/5/2020    Other viral warts 6/8/2019    Panic attack 6/17/2019    Positive depression screening 2/5/2019    Scalp cyst 11/22/2019    removed    Upper respiratory tract infection 1/5/2022    Viral upper respiratory tract infection 1/5/2022       Past Surgical History:   Procedure Laterality Date    LASIK Bilateral 01/19/2019    WISDOM TOOTH EXTRACTION         Family History   Problem Relation Age of Onset    Stroke Father     Sarcoidosis Mother     No Known Problems Sister Diabetes Maternal Grandmother     Hypertension Maternal Grandmother     Diabetes Maternal Grandfather     Hypertension Maternal Grandfather     Stomach cancer Paternal Grandmother     Breast cancer Neg Hx     Ovarian cancer Neg Hx     Colon cancer Neg Hx          Medications have been verified. Objective   /78   Pulse 89   Temp 98.9 °F (37.2 °C)   Resp 18   SpO2 99%        Physical Exam     Physical Exam  Vitals reviewed. Constitutional:       General: She is not in acute distress. Appearance: Normal appearance. She is normal weight. She is not ill-appearing. HENT:      Head: Normocephalic. Right Ear: Hearing, tympanic membrane, ear canal and external ear normal. No middle ear effusion. There is no impacted cerumen. Tympanic membrane is not erythematous or bulging. Left Ear: Hearing, tympanic membrane, ear canal and external ear normal.  No middle ear effusion. There is no impacted cerumen. Tympanic membrane is not erythematous or bulging. Nose: Nose normal. No congestion or rhinorrhea. Right Sinus: No maxillary sinus tenderness or frontal sinus tenderness. Left Sinus: No maxillary sinus tenderness or frontal sinus tenderness. Mouth/Throat:      Lips: Pink. Mouth: Mucous membranes are moist.      Pharynx: Oropharynx is clear. Uvula midline. Posterior oropharyngeal erythema present. No pharyngeal swelling, oropharyngeal exudate or uvula swelling. Tonsils: No tonsillar exudate or tonsillar abscesses. 1+ on the right. 1+ on the left. Eyes:      General:         Right eye: No discharge. Left eye: No discharge. Conjunctiva/sclera: Conjunctivae normal.   Cardiovascular:      Rate and Rhythm: Normal rate and regular rhythm. Pulses: Normal pulses. Heart sounds: Normal heart sounds. No murmur heard. No friction rub. No gallop. Pulmonary:      Effort: Pulmonary effort is normal.      Breath sounds: Normal breath sounds.  No wheezing, rhonchi or rales. Musculoskeletal:      Cervical back: Normal, normal range of motion and neck supple. No tenderness. Thoracic back: Spasms (right paraspinous) and tenderness (right paraspinous) present. Normal range of motion. Lymphadenopathy:      Cervical: No cervical adenopathy. Skin:     General: Skin is warm. Findings: No rash. Neurological:      Mental Status: She is alert.    Psychiatric:         Mood and Affect: Mood normal.         Behavior: Behavior normal.

## 2023-11-21 LAB
FLUAV RNA RESP QL NAA+PROBE: NEGATIVE
FLUBV RNA RESP QL NAA+PROBE: NEGATIVE
SARS-COV-2 RNA RESP QL NAA+PROBE: NEGATIVE

## 2023-11-22 ENCOUNTER — VBI (OUTPATIENT)
Dept: ADMINISTRATIVE | Facility: OTHER | Age: 33
End: 2023-11-22

## 2024-01-11 DIAGNOSIS — E55.9 VITAMIN D DEFICIENCY: ICD-10-CM

## 2024-01-11 RX ORDER — ACETAMINOPHEN 160 MG
TABLET,DISINTEGRATING ORAL
Qty: 90 CAPSULE | Refills: 0 | Status: SHIPPED | OUTPATIENT
Start: 2024-01-11

## 2024-04-04 ENCOUNTER — VBI (OUTPATIENT)
Dept: ADMINISTRATIVE | Facility: OTHER | Age: 34
End: 2024-04-04

## 2024-04-17 DIAGNOSIS — E55.9 VITAMIN D DEFICIENCY: ICD-10-CM

## 2024-04-17 RX ORDER — ACETAMINOPHEN 160 MG
TABLET,DISINTEGRATING ORAL
Qty: 90 CAPSULE | Refills: 1 | Status: SHIPPED | OUTPATIENT
Start: 2024-04-17

## 2024-05-15 ENCOUNTER — TELEPHONE (OUTPATIENT)
Age: 34
End: 2024-05-15

## 2024-06-24 DIAGNOSIS — F40.01 PANIC DISORDER WITH AGORAPHOBIA: ICD-10-CM

## 2024-06-24 NOTE — TELEPHONE ENCOUNTER
Patient called stating she needs refills. Patient states she needs a refill of clonazepan. Unfortunately I did not see this on patients med list. Please advise.    Patient is also requesting if she can get a refill of the xanax however patient would like it to be the 0.5 mg. Can this be refilled at the mg? Please advise.

## 2024-06-25 RX ORDER — ALPRAZOLAM 1 MG/1
TABLET ORAL
Qty: 20 TABLET | Refills: 0 | Status: SHIPPED | OUTPATIENT
Start: 2024-06-25

## 2024-07-26 ENCOUNTER — OFFICE VISIT (OUTPATIENT)
Dept: FAMILY MEDICINE CLINIC | Facility: CLINIC | Age: 34
End: 2024-07-26
Payer: COMMERCIAL

## 2024-07-26 VITALS
BODY MASS INDEX: 23.74 KG/M2 | OXYGEN SATURATION: 98 % | DIASTOLIC BLOOD PRESSURE: 60 MMHG | SYSTOLIC BLOOD PRESSURE: 100 MMHG | HEART RATE: 83 BPM | TEMPERATURE: 98.6 F | WEIGHT: 134 LBS | HEIGHT: 63 IN

## 2024-07-26 DIAGNOSIS — Z13.29 SCREENING FOR THYROID DISORDER: ICD-10-CM

## 2024-07-26 DIAGNOSIS — F33.9 DEPRESSION, RECURRENT (HCC): ICD-10-CM

## 2024-07-26 DIAGNOSIS — Z00.01 ENCOUNTER FOR WELL ADULT EXAM WITH ABNORMAL FINDINGS: Primary | ICD-10-CM

## 2024-07-26 DIAGNOSIS — F41.1 GENERALIZED ANXIETY DISORDER: ICD-10-CM

## 2024-07-26 DIAGNOSIS — E55.9 VITAMIN D DEFICIENCY: ICD-10-CM

## 2024-07-26 DIAGNOSIS — Z13.220 SCREENING, LIPID: ICD-10-CM

## 2024-07-26 DIAGNOSIS — J30.89 SEASONAL ALLERGIC RHINITIS DUE TO OTHER ALLERGIC TRIGGER: ICD-10-CM

## 2024-07-26 PROCEDURE — 99214 OFFICE O/P EST MOD 30 MIN: CPT | Performed by: FAMILY MEDICINE

## 2024-07-26 PROCEDURE — 99395 PREV VISIT EST AGE 18-39: CPT | Performed by: FAMILY MEDICINE

## 2024-07-26 NOTE — PROGRESS NOTES
Adult Annual Physical  Name: Naheed Acevedo      : 1990      MRN: [id number]  Encounter Provider: Jono Jurado MD  Encounter Date: 2024   Encounter department: [facility]    Assessment & Plan   1. Encounter for well adult exam with abnormal findings  Assessment & Plan:  Advice and education were given regarding nutrition, aerobic exercises, weight-bearing exercises, cardiovascular risk reduction, fall risk reduction, and age-appropriate supplements.     The patient was counseled regarding instructions for management, risk factor reductions, prognosis, risks and benefits of treatment options, patient and family education, and importance of compliance with treatment.  Patient is not sexually active she declined Pap smear  Orders:  -     CBC and differential; Future  -     Comprehensive metabolic panel; Future  2. Seasonal allergic rhinitis due to other allergic trigger  Assessment & Plan:  Chronic asymptomatic patient use Flonase nasal spray and loratadine on as needed basis tolerated well without side effect  Orders:  -     CBC and differential; Future  -     Comprehensive metabolic panel; Future  3. Vitamin D deficiency  Assessment & Plan:  Chronic asymptomatic continue vitamin D supplement due for vitamin D level before next appointment  Orders:  -     CBC and differential; Future  -     Comprehensive metabolic panel; Future  -     Vitamin D 25 hydroxy; Future  4. Depression, recurrent (HCC)  Assessment & Plan:  Chronic fair control patient currently not on any medication  Orders:  -     CBC and differential; Future  -     Comprehensive metabolic panel; Future  -     Vitamin B12; Future  5. Screening for thyroid disorder  -     TSH, 3rd generation with Free T4 reflex; Future  6. Screening, lipid  -     Lipid Panel with Direct LDL reflex; Future  7. Generalized anxiety disorder  Assessment & Plan:  Chronic patient use alprazolam on a as needed basis usually use it  when she travel by airplane she tolerated well without side effect    Immunizations and preventive care screenings were discussed with patient today. Appropriate education was printed on patient's after visit summary.    Counseling:  Alcohol/drug use: discussed moderation in alcohol intake, the recommendations for healthy alcohol use, and avoidance of illicit drug use.  Dental Health: discussed importance of regular tooth brushing, flossing, and dental visits.  Injury prevention: discussed safety/seat belts, safety helmets, smoke detectors, carbon dioxide detectors, and smoking near bedding or upholstery.  Sexual health: discussed sexually transmitted diseases, partner selection, use of condoms, avoidance of unintended pregnancy, and contraceptive alternatives.  Exercise: the importance of regular exercise/physical activity was discussed. Recommend exercise 3-5 times per week for at least 30 minutes.          History of Present Illness   Patient here for well exam and follow-up with a chronic condition and no new concerns past medical surgical family and social history discussed with the patient    Adult Annual Physical:  Patient presents for annual physical.     Diet and Physical Activity:  - Diet/Nutrition:. no special diet  - Exercise: 5-7 times a week on average.    Depression Screening:    - PHQ-9 Score: 0    General Health:  - Sleep: sleeps well.  - Hearing: normal hearing bilateral ears.  - Vision: no vision problems.  - Dental: regular dental visits.    /GYN Health:  - Follows with GYN: no.   - Menopause: premenopausal.   - History of STDs: no    Advanced Care Planning:  - Has an advanced directive?: no      Review of Systems   Constitutional:  Negative for chills and fever.   HENT:  Negative for ear pain and sore throat.    Eyes:  Negative for pain and visual disturbance.   Respiratory:  Negative for cough and shortness of breath.    Cardiovascular:  Negative for chest pain and palpitations.  "  Gastrointestinal:  Negative for abdominal pain, constipation, diarrhea and vomiting.   Genitourinary:  Negative for dysuria and hematuria.   Musculoskeletal:  Negative for arthralgias and back pain.   Skin:  Negative for color change and rash.   Neurological:  Negative for seizures and syncope.   All other systems reviewed and are negative.        Objective     /60 (BP Location: Left arm, Patient Position: Sitting)   Pulse 83   Temp 98.6 °F (37 °C) (Tympanic)   Ht 5' 3\" (1.6 m)   Wt 60.8 kg (134 lb)   LMP 07/07/2024 (Exact Date)   SpO2 98%   Breastfeeding No   BMI 23.74 kg/m²     Physical Exam  Vitals and nursing note reviewed.   Constitutional:       General: She is not in acute distress.     Appearance: She is well-developed. She is not diaphoretic.   HENT:      Head: Normocephalic.      Right Ear: Tympanic membrane and external ear normal.      Left Ear: Tympanic membrane and external ear normal.      Nose: No rhinorrhea.      Mouth/Throat:      Pharynx: No posterior oropharyngeal erythema.   Eyes:      General:         Right eye: No discharge.         Left eye: No discharge.      Conjunctiva/sclera: Conjunctivae normal.   Neck:      Vascular: No JVD.   Cardiovascular:      Rate and Rhythm: Normal rate and regular rhythm.      Heart sounds: Normal heart sounds. No murmur heard.     No gallop.   Pulmonary:      Effort: Pulmonary effort is normal. No respiratory distress.      Breath sounds: Normal breath sounds. No stridor. No wheezing or rales.   Chest:      Chest wall: No tenderness.   Abdominal:      General: There is no distension.      Palpations: Abdomen is soft. There is no mass.      Tenderness: There is no abdominal tenderness. There is no rebound.   Musculoskeletal:         General: No tenderness.      Cervical back: Normal range of motion and neck supple.   Lymphadenopathy:      Cervical: No cervical adenopathy.   Skin:     General: Skin is warm.      Findings: No erythema or rash. "   Neurological:      Mental Status: She is alert and oriented to person, place, and time.

## 2024-07-26 NOTE — ASSESSMENT & PLAN NOTE
Advice and education were given regarding nutrition, aerobic exercises, weight-bearing exercises, cardiovascular risk reduction, fall risk reduction, and age-appropriate supplements.     The patient was counseled regarding instructions for management, risk factor reductions, prognosis, risks and benefits of treatment options, patient and family education, and importance of compliance with treatment.  Patient is not sexually active she declined Pap smear

## 2024-07-26 NOTE — ASSESSMENT & PLAN NOTE
Chronic asymptomatic patient use Flonase nasal spray and loratadine on as needed basis tolerated well without side effect

## 2024-07-26 NOTE — ASSESSMENT & PLAN NOTE
Chronic patient use alprazolam on a as needed basis usually use it when she travel by airplane she tolerated well without side effect

## 2024-07-27 ENCOUNTER — APPOINTMENT (OUTPATIENT)
Dept: LAB | Age: 34
End: 2024-07-27
Payer: COMMERCIAL

## 2024-07-27 DIAGNOSIS — J30.89 SEASONAL ALLERGIC RHINITIS DUE TO OTHER ALLERGIC TRIGGER: ICD-10-CM

## 2024-07-27 DIAGNOSIS — Z13.220 SCREENING, LIPID: ICD-10-CM

## 2024-07-27 DIAGNOSIS — Z00.01 ENCOUNTER FOR WELL ADULT EXAM WITH ABNORMAL FINDINGS: ICD-10-CM

## 2024-07-27 DIAGNOSIS — E55.9 VITAMIN D DEFICIENCY: ICD-10-CM

## 2024-07-27 DIAGNOSIS — F33.9 DEPRESSION, RECURRENT (HCC): ICD-10-CM

## 2024-07-27 DIAGNOSIS — Z13.29 SCREENING FOR THYROID DISORDER: ICD-10-CM

## 2024-07-27 LAB
25(OH)D3 SERPL-MCNC: 38.5 NG/ML (ref 30–100)
ALBUMIN SERPL BCG-MCNC: 4 G/DL (ref 3.5–5)
ALP SERPL-CCNC: 51 U/L (ref 34–104)
ALT SERPL W P-5'-P-CCNC: 12 U/L (ref 7–52)
ANION GAP SERPL CALCULATED.3IONS-SCNC: 8 MMOL/L (ref 4–13)
AST SERPL W P-5'-P-CCNC: 16 U/L (ref 13–39)
BASOPHILS # BLD AUTO: 0.03 THOUSANDS/ΜL (ref 0–0.1)
BASOPHILS NFR BLD AUTO: 0 % (ref 0–1)
BILIRUB SERPL-MCNC: 0.41 MG/DL (ref 0.2–1)
BUN SERPL-MCNC: 14 MG/DL (ref 5–25)
CALCIUM SERPL-MCNC: 9.2 MG/DL (ref 8.4–10.2)
CHLORIDE SERPL-SCNC: 103 MMOL/L (ref 96–108)
CHOLEST SERPL-MCNC: 166 MG/DL
CO2 SERPL-SCNC: 25 MMOL/L (ref 21–32)
CREAT SERPL-MCNC: 0.64 MG/DL (ref 0.6–1.3)
EOSINOPHIL # BLD AUTO: 0.14 THOUSAND/ΜL (ref 0–0.61)
EOSINOPHIL NFR BLD AUTO: 2 % (ref 0–6)
ERYTHROCYTE [DISTWIDTH] IN BLOOD BY AUTOMATED COUNT: 12.6 % (ref 11.6–15.1)
GFR SERPL CREATININE-BSD FRML MDRD: 116 ML/MIN/1.73SQ M
GLUCOSE P FAST SERPL-MCNC: 84 MG/DL (ref 65–99)
HCT VFR BLD AUTO: 40.5 % (ref 34.8–46.1)
HDLC SERPL-MCNC: 61 MG/DL
HGB BLD-MCNC: 13.2 G/DL (ref 11.5–15.4)
IMM GRANULOCYTES # BLD AUTO: 0.03 THOUSAND/UL (ref 0–0.2)
IMM GRANULOCYTES NFR BLD AUTO: 0 % (ref 0–2)
LDLC SERPL CALC-MCNC: 88 MG/DL (ref 0–100)
LYMPHOCYTES # BLD AUTO: 1.58 THOUSANDS/ΜL (ref 0.6–4.47)
LYMPHOCYTES NFR BLD AUTO: 23 % (ref 14–44)
MCH RBC QN AUTO: 30.3 PG (ref 26.8–34.3)
MCHC RBC AUTO-ENTMCNC: 32.6 G/DL (ref 31.4–37.4)
MCV RBC AUTO: 93 FL (ref 82–98)
MONOCYTES # BLD AUTO: 0.55 THOUSAND/ΜL (ref 0.17–1.22)
MONOCYTES NFR BLD AUTO: 8 % (ref 4–12)
NEUTROPHILS # BLD AUTO: 4.5 THOUSANDS/ΜL (ref 1.85–7.62)
NEUTS SEG NFR BLD AUTO: 67 % (ref 43–75)
NRBC BLD AUTO-RTO: 0 /100 WBCS
PLATELET # BLD AUTO: 210 THOUSANDS/UL (ref 149–390)
PMV BLD AUTO: 10.5 FL (ref 8.9–12.7)
POTASSIUM SERPL-SCNC: 4 MMOL/L (ref 3.5–5.3)
PROT SERPL-MCNC: 6.9 G/DL (ref 6.4–8.4)
RBC # BLD AUTO: 4.35 MILLION/UL (ref 3.81–5.12)
SODIUM SERPL-SCNC: 136 MMOL/L (ref 135–147)
TRIGL SERPL-MCNC: 86 MG/DL
TSH SERPL DL<=0.05 MIU/L-ACNC: 1.62 UIU/ML (ref 0.45–4.5)
VIT B12 SERPL-MCNC: 675 PG/ML (ref 180–914)
WBC # BLD AUTO: 6.83 THOUSAND/UL (ref 4.31–10.16)

## 2024-07-27 PROCEDURE — 82607 VITAMIN B-12: CPT

## 2024-07-27 PROCEDURE — 85025 COMPLETE CBC W/AUTO DIFF WBC: CPT

## 2024-07-27 PROCEDURE — 80053 COMPREHEN METABOLIC PANEL: CPT

## 2024-07-27 PROCEDURE — 36415 COLL VENOUS BLD VENIPUNCTURE: CPT

## 2024-07-27 PROCEDURE — 84443 ASSAY THYROID STIM HORMONE: CPT

## 2024-07-27 PROCEDURE — 82306 VITAMIN D 25 HYDROXY: CPT

## 2024-07-27 PROCEDURE — 80061 LIPID PANEL: CPT

## 2024-08-06 ENCOUNTER — VBI (OUTPATIENT)
Dept: ADMINISTRATIVE | Facility: OTHER | Age: 34
End: 2024-08-06

## 2024-08-06 NOTE — TELEPHONE ENCOUNTER
08/06/24 11:28 AM     Chart reviewed for Pap Smear (HPV) aka Cervical Cancer Screening ; nothing is submitted to the patient's insurance at this time.     LAUREN HERNANDEZ MA   [facility]

## 2024-09-30 ENCOUNTER — TELEPHONE (OUTPATIENT)
Age: 34
End: 2024-09-30

## 2024-09-30 NOTE — TELEPHONE ENCOUNTER
Pt called to see if she could have a lab test done to check her hormones. Pt said she spoke with the doctor at last visit in July about her mood fluctuations. Please advise if labs can be added to chart or if she needs another visit.

## 2024-10-02 ENCOUNTER — TELEPHONE (OUTPATIENT)
Age: 34
End: 2024-10-02

## 2024-10-02 DIAGNOSIS — Z11.1 SCREENING FOR TUBERCULOSIS: Primary | ICD-10-CM

## 2024-10-02 NOTE — TELEPHONE ENCOUNTER
[employer] message sent to pt to let her know she can send the paperwork to our office through [employer]. No fee for forms.

## 2024-10-02 NOTE — TELEPHONE ENCOUNTER
Pt called in stating she is switching job and they need her physical form to be completed. She is going to attach the forms thru   [facility] for her provider completion based on her July physical visit. She would like to know the charges for the completion of the form and estimated time for the completion. Kindly call her back to update on the same please. Thanks

## 2024-10-02 NOTE — TELEPHONE ENCOUNTER
Patient called stating she is starting a new job and needs to have a quantiferon gold TB test. Please place order for patient and advise the order is placed.

## 2024-10-05 ENCOUNTER — APPOINTMENT (OUTPATIENT)
Dept: LAB | Age: 34
End: 2024-10-05
Payer: COMMERCIAL

## 2024-10-05 DIAGNOSIS — Z11.1 SCREENING FOR TUBERCULOSIS: ICD-10-CM

## 2024-10-05 PROCEDURE — 36415 COLL VENOUS BLD VENIPUNCTURE: CPT

## 2024-10-05 PROCEDURE — 86480 TB TEST CELL IMMUN MEASURE: CPT

## 2024-10-06 LAB
GAMMA INTERFERON BACKGROUND BLD IA-ACNC: <0 IU/ML
M TB IFN-G BLD-IMP: NEGATIVE
M TB IFN-G CD4+ BCKGRND COR BLD-ACNC: 0 IU/ML
M TB IFN-G CD4+ BCKGRND COR BLD-ACNC: 0 IU/ML
MITOGEN IGNF BCKGRD COR BLD-ACNC: 10 IU/ML

## 2024-10-09 ENCOUNTER — TELEMEDICINE (OUTPATIENT)
Dept: FAMILY MEDICINE CLINIC | Facility: CLINIC | Age: 34
End: 2024-10-09
Payer: COMMERCIAL

## 2024-10-09 DIAGNOSIS — J30.89 SEASONAL ALLERGIC RHINITIS DUE TO OTHER ALLERGIC TRIGGER: ICD-10-CM

## 2024-10-09 DIAGNOSIS — F41.1 GENERALIZED ANXIETY DISORDER: Primary | ICD-10-CM

## 2024-10-09 PROCEDURE — 99214 OFFICE O/P EST MOD 30 MIN: CPT | Performed by: FAMILY MEDICINE

## 2024-10-09 RX ORDER — FLUTICASONE PROPIONATE 50 MCG
2 SPRAY, SUSPENSION (ML) NASAL DAILY
Qty: 16 G | Refills: 1 | Status: SHIPPED | OUTPATIENT
Start: 2024-10-09

## 2024-10-09 NOTE — PROGRESS NOTES
Virtual Regular Visit  Name: Naheed Acevedo      : 1990      MRN: [id number]  Encounter Provider: Jono Jurado MD  Encounter Date: 10/9/2024   Encounter department: [facility]    Verification of patient location:    Patient is located at Home in the following state in which I hold an active license [place]    Assessment & Plan  Generalized anxiety disorder  Symptomatic with a new stress Discussed with the patient psychotherapist recommend to use alprazolam on a as needed basis discussed sleeping hygiene relaxation technique patient not interested in medication  As far hormonal check for mood changes.  Patient recently had TSH within normal range       Seasonal allergic rhinitis due to other allergic trigger  Chronic fair controlled a request refill on the Flonase nasal spray 2 sprays nostril once a day proper use and possible side effects reviewed    Orders:    fluticasone (FLONASE) 50 mcg/act nasal spray; 2 sprays into each nostril daily         Encounter provider Jono Jurado MD    The patient was identified by name and date of birth. Naheed Acevedo was informed that this is a telemedicine visit and that the visit is being conducted through the Epic Embedded platform. She agrees to proceed..  My office door was closed. No one else was in the room.  She acknowledged consent and understanding of privacy and security of the video platform. The patient has agreed to participate and understands they can discontinue the visit at any time.    Patient is aware this is a billable service.     History of Present Illness     Patient concerned about her mood change patient started dating and her role model fluctuated and emotional fluctuated from day today and she is worried about that she would like to be just her on to check if this is related to hormone change in her body patient not have history denies anxiety disorder she used to be on medication she stopped taking it feels is not  needed no diet change no weight change no GI problem          Review of Systems   Constitutional:  Negative for chills and fever.   HENT:  Positive for congestion. Negative for ear pain and sore throat.    Eyes:  Negative for pain and visual disturbance.   Respiratory:  Negative for cough and shortness of breath.    Cardiovascular:  Negative for chest pain and palpitations.   Gastrointestinal:  Negative for abdominal pain, constipation, diarrhea and vomiting.   Genitourinary:  Negative for dysuria and hematuria.   Musculoskeletal:  Negative for arthralgias and back pain.   Skin:  Negative for color change and rash.   Neurological:  Negative for seizures and syncope.   Psychiatric/Behavioral:  The patient is nervous/anxious.    All other systems reviewed and are negative.          Objective     There were no vitals taken for this visit.  Physical Exam  Constitutional:       General: She is not in acute distress.     Appearance: She is well-developed. She is not ill-appearing, toxic-appearing or diaphoretic.   HENT:      Head: Normocephalic.      Nose: Nose normal. No congestion or rhinorrhea.      Mouth/Throat:      Mouth: Mucous membranes are moist.      Pharynx: No oropharyngeal exudate or posterior oropharyngeal erythema.   Eyes:      General:         Right eye: No discharge.         Left eye: No discharge.      Conjunctiva/sclera: Conjunctivae normal.   Neck:      Vascular: No JVD.      Comments: No grossly visible mass   Pulmonary:      Effort: Pulmonary effort is normal. No respiratory distress.      Breath sounds: No stridor. No wheezing.   Abdominal:      General: There is no distension.      Comments: Patient state her abdomen and a soft she also state no tender  Patient deny any visible or palpable bulging to suggest hernia  I was able to visualize abdomen and there is no change in the color no distension no visible mass   Musculoskeletal:         General: Normal range of motion.      Cervical back: Normal  range of motion and neck supple.   Skin:     Findings: No erythema or rash.   Neurological:      Mental Status: She is alert and oriented to person, place, and time.   Psychiatric:         Mood and Affect: Mood normal.         Visit Time  Total Visit Duration: 15         family

## 2024-10-13 NOTE — ASSESSMENT & PLAN NOTE
Baljinder Masters is a 8 year old male here with mother presenting with:    Symptoms: right foot pain and swelling after jumping off stairs 3 days ago. Is ambulating.      OTC medications: none     Recent ABX use: denies     Work note needed: note for school              Symptomatic with a new stress Discussed with the patient psychotherapist recommend to use alprazolam on a as needed basis discussed sleeping hygiene relaxation technique patient not interested in medication  As far hormonal check for mood changes.  Patient recently had TSH within normal range

## 2024-10-13 NOTE — ASSESSMENT & PLAN NOTE
Chronic fair controlled a request refill on the Flonase nasal spray 2 sprays nostril once a day proper use and possible side effects reviewed    Orders:    fluticasone (FLONASE) 50 mcg/act nasal spray; 2 sprays into each nostril daily

## 2024-10-14 ENCOUNTER — OFFICE VISIT (OUTPATIENT)
Dept: URGENT CARE | Age: 34
End: 2024-10-14
Payer: COMMERCIAL

## 2024-10-14 VITALS
DIASTOLIC BLOOD PRESSURE: 74 MMHG | TEMPERATURE: 99 F | HEART RATE: 98 BPM | OXYGEN SATURATION: 98 % | RESPIRATION RATE: 18 BRPM | SYSTOLIC BLOOD PRESSURE: 116 MMHG

## 2024-10-14 DIAGNOSIS — J02.9 SORE THROAT: Primary | ICD-10-CM

## 2024-10-14 LAB — S PYO AG THROAT QL: NEGATIVE

## 2024-10-14 PROCEDURE — 87070 CULTURE OTHR SPECIMN AEROBIC: CPT

## 2024-10-14 PROCEDURE — 87880 STREP A ASSAY W/OPTIC: CPT

## 2024-10-14 PROCEDURE — G0382 LEV 3 HOSP TYPE B ED VISIT: HCPCS | Performed by: FAMILY MEDICINE

## 2024-10-14 PROCEDURE — S9083 URGENT CARE CENTER GLOBAL: HCPCS | Performed by: FAMILY MEDICINE

## 2024-10-14 NOTE — LETTER
October 14, 2024     Patient: Naheed Acevedo   YOB: 1990   Date of Visit: 10/14/2024       To Whom It May Concern:    It is my medical opinion that Naheed Acevedo may return to work on 10/16/24 if fever free for 24 hours and symptoms are improving.          Sincerely,        KORY Jones    CC: No Recipients

## 2024-10-14 NOTE — PATIENT INSTRUCTIONS
Hydration and rest.  Acetaminophen and ibuprofen for pain relief and fever reduction.   Recommend throat lozenges, anesthetic spray such as chloraseptic, and gargling warm salt water for throat irritation.   Recommend at home covid test.   Throat culture sent.   Use the [facility] MyChart to obtain lab results.  PCP follow up in 3-5 days.   Go to an emergency department if difficulty breathing occurs or if symptoms worsen.

## 2024-10-16 LAB — BACTERIA THROAT CULT: NORMAL

## 2024-11-02 DIAGNOSIS — J30.89 SEASONAL ALLERGIC RHINITIS DUE TO OTHER ALLERGIC TRIGGER: ICD-10-CM

## 2024-11-04 RX ORDER — FLUTICASONE PROPIONATE 50 MCG
SPRAY, SUSPENSION (ML) NASAL
Qty: 48 ML | Refills: 1 | Status: SHIPPED | OUTPATIENT
Start: 2024-11-04

## 2024-12-02 ENCOUNTER — TELEPHONE (OUTPATIENT)
Dept: PHYSICAL THERAPY | Facility: CLINIC | Age: 34
End: 2024-12-02

## 2024-12-02 NOTE — TELEPHONE ENCOUNTER
Returned a call to Naheed after a voice message was left on [facility] PT voicemail inquiring about aquatic therapy. Let her know that this location ([place]) is pediatrics only and that she should contact the [facility] location and phone number ([phone])for adult aquatic therapy.

## 2024-12-09 ENCOUNTER — TELEPHONE (OUTPATIENT)
Age: 34
End: 2024-12-09

## 2024-12-09 DIAGNOSIS — E55.9 VITAMIN D DEFICIENCY: ICD-10-CM

## 2024-12-09 DIAGNOSIS — F40.01 PANIC DISORDER WITH AGORAPHOBIA: ICD-10-CM

## 2024-12-09 RX ORDER — ALPRAZOLAM 0.25 MG/1
TABLET ORAL
Qty: 20 TABLET | Refills: 0 | Status: SHIPPED | OUTPATIENT
Start: 2024-12-09

## 2024-12-09 RX ORDER — ACETAMINOPHEN 160 MG
2000 TABLET,DISINTEGRATING ORAL DAILY
Qty: 90 CAPSULE | Refills: 0 | Status: SHIPPED | OUTPATIENT
Start: 2024-12-09

## 2024-12-09 RX ORDER — ACETAMINOPHEN 160 MG
2000 TABLET,DISINTEGRATING ORAL DAILY
Qty: 90 CAPSULE | Refills: 1 | Status: CANCELLED | OUTPATIENT
Start: 2024-12-09

## 2024-12-09 NOTE — TELEPHONE ENCOUNTER
Pt called to refill her Xanax, however she states it is 0.25mg not the 1mg.  This is for her when she travels. Traveling in a few weeks.  States 1 mg makes her to drowsy.    Please send script for 0.25mg  [facility] [place].

## 2024-12-09 NOTE — TELEPHONE ENCOUNTER
Medication: Cholecalciferol Vit D3    Dose/Frequency: 50mcg od    Quantity: 90    Pharmacy: [facility]    Office:   [x] PCP/Provider -   [] Speciality/Provider -     Does the patient have enough for 3 days?   [x] Yes   [] No - Send as HP to POD

## 2024-12-26 DIAGNOSIS — F40.01 PANIC DISORDER WITH AGORAPHOBIA: ICD-10-CM

## 2024-12-30 ENCOUNTER — VBI (OUTPATIENT)
Dept: ADMINISTRATIVE | Facility: OTHER | Age: 34
End: 2024-12-30

## 2024-12-30 RX ORDER — ALPRAZOLAM 0.25 MG/1
TABLET ORAL
Qty: 20 TABLET | Refills: 4 | Status: SHIPPED | OUTPATIENT
Start: 2024-12-30

## 2024-12-30 NOTE — TELEPHONE ENCOUNTER
12/30/24 7:29 AM     Chart reviewed for Pap Smear (HPV) aka Cervical Cancer Screening ; nothing is submitted to the patient's insurance at this time.     Archana Duke MA   [facility]

## 2025-01-13 ENCOUNTER — VBI (OUTPATIENT)
Dept: ADMINISTRATIVE | Facility: OTHER | Age: 35
End: 2025-01-13

## 2025-01-14 NOTE — TELEPHONE ENCOUNTER
01/13/25 8:02 PM     Chart reviewed for   Cervical Cancer Screening    ; nothing is submitted to the patient's insurance at this time.     LAUREN HERNANDEZ MA   [facility]

## 2025-02-10 ENCOUNTER — TELEPHONE (OUTPATIENT)
Age: 35
End: 2025-02-10

## 2025-02-10 NOTE — TELEPHONE ENCOUNTER
Pt  stated her deviated septum was discussed at a previous appt and she would like to discuss it further. Pt maciel call back to schedule an appt once she checks her schedule.

## 2025-02-17 ENCOUNTER — OFFICE VISIT (OUTPATIENT)
Dept: FAMILY MEDICINE CLINIC | Facility: CLINIC | Age: 35
End: 2025-02-17
Payer: COMMERCIAL

## 2025-02-17 VITALS
TEMPERATURE: 99.2 F | HEART RATE: 80 BPM | OXYGEN SATURATION: 98 % | BODY MASS INDEX: 24.27 KG/M2 | WEIGHT: 137 LBS | HEIGHT: 63 IN | DIASTOLIC BLOOD PRESSURE: 60 MMHG | SYSTOLIC BLOOD PRESSURE: 110 MMHG

## 2025-02-17 DIAGNOSIS — E55.9 VITAMIN D DEFICIENCY: ICD-10-CM

## 2025-02-17 DIAGNOSIS — Z13.0 SCREENING FOR BLOOD DISEASE: ICD-10-CM

## 2025-02-17 DIAGNOSIS — Z13.29 SCREENING FOR THYROID DISORDER: ICD-10-CM

## 2025-02-17 DIAGNOSIS — N94.3 PMS (PREMENSTRUAL SYNDROME): ICD-10-CM

## 2025-02-17 DIAGNOSIS — R53.83 OTHER FATIGUE: ICD-10-CM

## 2025-02-17 DIAGNOSIS — Z13.220 SCREENING FOR LIPID DISORDERS: ICD-10-CM

## 2025-02-17 DIAGNOSIS — J34.2 NASAL SEPTAL DEVIATION: Primary | ICD-10-CM

## 2025-02-17 DIAGNOSIS — Z13.21 ENCOUNTER FOR VITAMIN DEFICIENCY SCREENING: ICD-10-CM

## 2025-02-17 DIAGNOSIS — Z13.228 SCREENING FOR METABOLIC DISORDER: ICD-10-CM

## 2025-02-17 PROCEDURE — 99214 OFFICE O/P EST MOD 30 MIN: CPT | Performed by: FAMILY MEDICINE

## 2025-02-18 ENCOUNTER — TELEPHONE (OUTPATIENT)
Age: 35
End: 2025-02-18

## 2025-02-18 PROBLEM — N94.3 PMS (PREMENSTRUAL SYNDROME): Status: ACTIVE | Noted: 2025-02-18

## 2025-02-18 NOTE — PROGRESS NOTES
Name: Naheed Acevedo      : 1990      MRN: [id number]  Encounter Provider: Jono Jurado MD  Encounter Date: 2025   Encounter department: [facility]  :  Assessment & Plan  Nasal septal deviation  new diagnosis symptomatic recommend to be evaluated by ENT  Orders:  •  Ambulatory Referral to Otolaryngology; Future    Other fatigue  Symptomatic patient history of vitamin D deficiency recommend to continue vitamin D supplement keep well hydration discussed sleeping hygiene  Orders:  •  CBC and differential; Future  •  Comprehensive metabolic panel; Future  •  Lipid Panel with Direct LDL reflex; Future  •  TSH, 3rd generation with Free T4 reflex; Future  •  Vitamin B12; Future  •  Vitamin D 25 hydroxy; Future    PMS (premenstrual syndrome)  symptomatic discussed with patient start SSRI she declined for today recommend well hydration recommend follow-up with GYN if get worse  Orders:  •  CBC and differential; Future  •  Comprehensive metabolic panel; Future  •  Lipid Panel with Direct LDL reflex; Future  •  TSH, 3rd generation with Free T4 reflex; Future  •  Vitamin B12; Future  •  Vitamin D 25 hydroxy; Future    Vitamin D deficiency    Orders:  •  Vitamin D 25 hydroxy; Future    Encounter for vitamin deficiency screening    Orders:  •  Vitamin B12; Future    Screening for thyroid disorder    Orders:  •  TSH, 3rd generation with Free T4 reflex; Future    Screening for lipid disorders    Orders:  •  Lipid Panel with Direct LDL reflex; Future    Screening for metabolic disorder    Orders:  •  Comprehensive metabolic panel; Future    Screening for blood disease    Orders:  •  CBC and differential; Future           History of Present Illness   Fatigue  This is a recurrent problem. The current episode started more than 1 month ago. The problem has been waxing and waning. Associated symptoms include congestion, fatigue and vomiting. Pertinent negatives include no abdominal pain,  "anorexia, arthralgias, chest pain, chills, coughing, joint swelling, nausea, neck pain, numbness, rash, sore throat, swollen glands, urinary symptoms, visual change or weakness. Nothing aggravates the symptoms. She has tried drinking for the symptoms. The treatment provided no relief.     Review of Systems   Constitutional:  Positive for fatigue. Negative for chills.   HENT:  Positive for congestion. Negative for sore throat.    Respiratory:  Negative for cough.    Cardiovascular:  Negative for chest pain.   Gastrointestinal:  Positive for vomiting. Negative for abdominal pain, anorexia and nausea.   Musculoskeletal:  Negative for arthralgias, joint swelling and neck pain.   Skin:  Negative for rash.   Neurological:  Negative for weakness and numbness.       Objective   /60 (BP Location: Left arm, Patient Position: Sitting)   Pulse 80   Temp 99.2 °F (37.3 °C) (Tympanic)   Ht 5' 3\" (1.6 m)   Wt 62.1 kg (137 lb)   LMP 02/07/2025 (Exact Date)   SpO2 98%   Breastfeeding No   BMI 24.27 kg/m²      Physical Exam  Vitals and nursing note reviewed.   Constitutional:       General: She is not in acute distress.     Appearance: She is well-developed. She is not diaphoretic.   HENT:      Head: Normocephalic.      Right Ear: Tympanic membrane and external ear normal.      Left Ear: Tympanic membrane and external ear normal.      Nose: Septal deviation present. No rhinorrhea.      Mouth/Throat:      Pharynx: No posterior oropharyngeal erythema.   Eyes:      General:         Right eye: No discharge.         Left eye: No discharge.      Conjunctiva/sclera: Conjunctivae normal.   Neck:      Vascular: No JVD.   Cardiovascular:      Rate and Rhythm: Normal rate and regular rhythm.      Heart sounds: Normal heart sounds. No murmur heard.     No gallop.   Pulmonary:      Effort: Pulmonary effort is normal. No respiratory distress.      Breath sounds: Normal breath sounds. No wheezing.   Abdominal:      General: There is no " distension.      Palpations: Abdomen is soft.      Tenderness: There is no abdominal tenderness. There is no rebound.   Musculoskeletal:         General: No tenderness.      Cervical back: Normal range of motion and neck supple.   Lymphadenopathy:      Cervical: No cervical adenopathy.   Skin:     General: Skin is warm.      Findings: No rash.   Neurological:      Mental Status: She is alert and oriented to person, place, and time.

## 2025-02-18 NOTE — ASSESSMENT & PLAN NOTE
symptomatic discussed with patient start SSRI she declined for today recommend well hydration recommend follow-up with GYN if get worse  Orders:  •  CBC and differential; Future  •  Comprehensive metabolic panel; Future  •  Lipid Panel with Direct LDL reflex; Future  •  TSH, 3rd generation with Free T4 reflex; Future  •  Vitamin B12; Future  •  Vitamin D 25 hydroxy; Future

## 2025-02-18 NOTE — ASSESSMENT & PLAN NOTE
new diagnosis symptomatic recommend to be evaluated by ENT  Orders:  •  Ambulatory Referral to Otolaryngology; Future

## 2025-02-18 NOTE — ASSESSMENT & PLAN NOTE
Symptomatic patient history of vitamin D deficiency recommend to continue vitamin D supplement keep well hydration discussed sleeping hygiene  Orders:  •  CBC and differential; Future  •  Comprehensive metabolic panel; Future  •  Lipid Panel with Direct LDL reflex; Future  •  TSH, 3rd generation with Free T4 reflex; Future  •  Vitamin B12; Future  •  Vitamin D 25 hydroxy; Future

## 2025-05-08 ENCOUNTER — VBI (OUTPATIENT)
Dept: ADMINISTRATIVE | Facility: OTHER | Age: 35
End: 2025-05-08

## 2025-05-08 NOTE — TELEPHONE ENCOUNTER
05/08/25 1:40 PM     Chart reviewed for Pap Smear (HPV) aka Cervical Cancer Screening ; nothing is submitted to the patient's insurance at this time.     Archana Duke MA   [facility]

## 2025-07-01 ENCOUNTER — TELEPHONE (OUTPATIENT)
Age: 35
End: 2025-07-01

## 2025-07-01 NOTE — TELEPHONE ENCOUNTER
Patient asking fro a new medication to be refilled for her: clonazepam. She would like this for travel to be sent to the [facility]. She would also like to know if she is going on a 9 hour flight what medication should she take, how long will it last, and how long will it leave my body or how can she flush it out then. She would like to know the difference between the clonazepam and xanax.

## 2025-07-01 NOTE — TELEPHONE ENCOUNTER
Called and spoke with patient to advise that since Dr. Jurado hasn't filled this medication for her prior that she would need to come in to discuss. Patient has been scheduled for appointment with Dr. Jurado on 7/8/25 to discuss.

## 2025-07-08 ENCOUNTER — OFFICE VISIT (OUTPATIENT)
Dept: FAMILY MEDICINE CLINIC | Facility: CLINIC | Age: 35
End: 2025-07-08
Payer: COMMERCIAL

## 2025-07-08 VITALS
HEART RATE: 86 BPM | OXYGEN SATURATION: 99 % | WEIGHT: 132.6 LBS | SYSTOLIC BLOOD PRESSURE: 120 MMHG | TEMPERATURE: 98 F | BODY MASS INDEX: 22.64 KG/M2 | HEIGHT: 64 IN | DIASTOLIC BLOOD PRESSURE: 80 MMHG

## 2025-07-08 DIAGNOSIS — F41.8 SITUATIONAL ANXIETY: ICD-10-CM

## 2025-07-08 DIAGNOSIS — R53.83 OTHER FATIGUE: Primary | ICD-10-CM

## 2025-07-08 DIAGNOSIS — N89.8 VAGINAL DISCHARGE: ICD-10-CM

## 2025-07-08 PROCEDURE — 87480 CANDIDA DNA DIR PROBE: CPT | Performed by: FAMILY MEDICINE

## 2025-07-08 PROCEDURE — 87660 TRICHOMONAS VAGIN DIR PROBE: CPT | Performed by: FAMILY MEDICINE

## 2025-07-08 PROCEDURE — 87510 GARDNER VAG DNA DIR PROBE: CPT | Performed by: FAMILY MEDICINE

## 2025-07-08 PROCEDURE — 99214 OFFICE O/P EST MOD 30 MIN: CPT | Performed by: FAMILY MEDICINE

## 2025-07-09 ENCOUNTER — TELEPHONE (OUTPATIENT)
Age: 35
End: 2025-07-09

## 2025-07-09 DIAGNOSIS — F41.8 SITUATIONAL ANXIETY: Primary | ICD-10-CM

## 2025-07-09 DIAGNOSIS — N89.8 VAGINAL DISCHARGE: Primary | ICD-10-CM

## 2025-07-09 LAB
CANDIDA RRNA VAG QL PROBE: NOT DETECTED
G VAGINALIS RRNA GENITAL QL PROBE: NOT DETECTED
T VAGINALIS RRNA GENITAL QL PROBE: NOT DETECTED

## 2025-07-09 RX ORDER — CLONAZEPAM 1 MG/1
1 TABLET ORAL DAILY
Qty: 20 TABLET | Refills: 0 | Status: SHIPPED | OUTPATIENT
Start: 2025-07-09

## 2025-07-09 NOTE — TELEPHONE ENCOUNTER
Patient called in stating if her Vaginal Swab results are in and reviewed. Did check on the chart and relayed once reviewed someone from the office will reach out to her. Please do help.Thanks

## 2025-07-09 NOTE — TELEPHONE ENCOUNTER
Last visit 07/08/2025  Next appointment 08/05/2025    Patient stated that Dr Jurado asked her to call back with the name of the medication that she needs a refill on, since the medication was previously prescribed by another doctor. That medication is Clonazepam (Klonopin) 1 mg tablet. Instructions were to take 1 tablet (1 mg total) by mouth 2 (two) times a day as needed for anxiety for up to 5 days and discontinued on 02/28/2023. Please send script to [facility] in [place]. Please advise.

## 2025-07-10 PROBLEM — F41.8 SITUATIONAL ANXIETY: Status: ACTIVE | Noted: 2025-07-10

## 2025-07-10 PROBLEM — N89.8 VAGINAL DISCHARGE: Status: ACTIVE | Noted: 2025-07-10

## 2025-07-10 NOTE — ASSESSMENT & PLAN NOTE
Patient traveling by airplane to [place] and would like to have medication to help out with anxiety on the airplane she usually prescribed by psychiatric Klonopin she is not sure about the dose she will call with the information patient has not been seen that psychiatric f

## 2025-07-10 NOTE — ASSESSMENT & PLAN NOTE
Symptomatic on and off recommended to do blood work to check for anemia TSH discussed important sleeping hygiene and keep well hydration

## 2025-07-10 NOTE — ASSESSMENT & PLAN NOTE
New diagnosis patient recently moved and she is concerned about infection vaginal probe done and send out we will follow-up with the result accordingly proper hygiene reviewed with the patient  Orders:  •  Vaginosis DNA Probe

## 2025-07-10 NOTE — PROGRESS NOTES
Name: Naheed Acevedo      : 1990      MRN: [id number]  Encounter Provider: Jono Jurado MD  Encounter Date: 2025   Encounter department: [facility]  :  Assessment & Plan  Other fatigue  Symptomatic on and off recommended to do blood work to check for anemia TSH discussed important sleeping hygiene and keep well hydration       Vaginal discharge  New diagnosis patient recently moved and she is concerned about infection vaginal probe done and send out we will follow-up with the result accordingly proper hygiene reviewed with the patient  Orders:  •  Vaginosis DNA Probe    Situational anxiety  Patient traveling by airplane to [place] and would like to have medication to help out with anxiety on the airplane she usually prescribed by psychiatric Klonopin she is not sure about the dose she will call with the information patient has not been seen that psychiatric f         Assessment & Plan           History of Present Illness   Patient today concerned about fatigue and effusion she gets fatigued on and off and now is getting worse she recently get  and that she has not been sleeping well she been overwhelmed for the wedding preparation and no rash no joint swelling no dryness in the eye dryness in the mouth patient been having some vaginal itching and pain and discharge and concerned about infection.  Patient going away 3-year-old pregnant and long flight and usually she gets a panic with flying in a plane that she is to see psychiatry before and give her medication for that does not see them anymore and she asked for something to help      Review of Systems   Constitutional:  Positive for fatigue. Negative for activity change, appetite change and fever.   HENT:  Negative for congestion, ear pain, sinus pressure, sinus pain and sore throat.    Eyes:  Negative for discharge and redness.   Respiratory:  Negative for cough, chest tightness and shortness of breath.   "  Cardiovascular:  Negative for chest pain, palpitations and leg swelling.   Gastrointestinal:  Negative for abdominal pain, constipation and diarrhea.   Genitourinary:  Positive for vaginal discharge. Negative for dysuria, flank pain, frequency and hematuria.   Musculoskeletal:  Negative for gait problem.   Skin:  Negative for pallor and rash.   Neurological:  Negative for dizziness, tremors, weakness, numbness and headaches.   Hematological:  Does not bruise/bleed easily.       Objective   /80 (BP Location: Left arm, Patient Position: Sitting, Cuff Size: Standard)   Pulse 86   Temp 98 °F (36.7 °C) (Tympanic)   Ht 5' 3.9\" (1.623 m)   Wt 60.1 kg (132 lb 9.6 oz)   SpO2 99%   BMI 22.83 kg/m²      Physical Exam  Vitals and nursing note reviewed.   Constitutional:       General: She is not in acute distress.     Appearance: She is well-developed. She is not diaphoretic.   HENT:      Head: Normocephalic.      Right Ear: Tympanic membrane and external ear normal.      Left Ear: Tympanic membrane and external ear normal.      Nose: No rhinorrhea.      Mouth/Throat:      Pharynx: No posterior oropharyngeal erythema.     Eyes:      General:         Right eye: No discharge.         Left eye: No discharge.      Conjunctiva/sclera: Conjunctivae normal.     Neck:      Vascular: No JVD.     Cardiovascular:      Rate and Rhythm: Normal rate and regular rhythm.      Heart sounds: Normal heart sounds. No murmur heard.     No gallop.   Pulmonary:      Effort: Pulmonary effort is normal. No respiratory distress.      Breath sounds: Normal breath sounds. No wheezing.   Abdominal:      General: There is no distension.      Palpations: Abdomen is soft.      Tenderness: There is no abdominal tenderness. There is no rebound.   Genitourinary:     Vagina: No vaginal discharge.     Musculoskeletal:         General: No tenderness.      Cervical back: Normal range of motion and neck supple.   Lymphadenopathy:      Cervical: No " cervical adenopathy.     Skin:     General: Skin is warm.      Findings: No rash.     Neurological:      Mental Status: She is alert and oriented to person, place, and time.

## 2025-07-14 ENCOUNTER — TELEPHONE (OUTPATIENT)
Age: 35
End: 2025-07-14

## 2025-07-14 ENCOUNTER — APPOINTMENT (OUTPATIENT)
Dept: LAB | Facility: HOSPITAL | Age: 35
End: 2025-07-14
Attending: FAMILY MEDICINE
Payer: COMMERCIAL

## 2025-07-14 DIAGNOSIS — Z13.220 SCREENING FOR LIPID DISORDERS: ICD-10-CM

## 2025-07-14 DIAGNOSIS — R53.83 OTHER FATIGUE: ICD-10-CM

## 2025-07-14 DIAGNOSIS — E55.9 VITAMIN D DEFICIENCY: ICD-10-CM

## 2025-07-14 DIAGNOSIS — Z13.0 SCREENING FOR BLOOD DISEASE: ICD-10-CM

## 2025-07-14 DIAGNOSIS — N94.3 PMS (PREMENSTRUAL SYNDROME): ICD-10-CM

## 2025-07-14 DIAGNOSIS — Z13.228 SCREENING FOR METABOLIC DISORDER: ICD-10-CM

## 2025-07-14 DIAGNOSIS — Z13.29 SCREENING FOR THYROID DISORDER: ICD-10-CM

## 2025-07-14 DIAGNOSIS — Z13.21 ENCOUNTER FOR VITAMIN DEFICIENCY SCREENING: ICD-10-CM

## 2025-07-14 LAB
25(OH)D3 SERPL-MCNC: 37.1 NG/ML (ref 30–100)
ALBUMIN SERPL BCG-MCNC: 4.3 G/DL (ref 3.5–5)
ALP SERPL-CCNC: 48 U/L (ref 34–104)
ALT SERPL W P-5'-P-CCNC: 10 U/L (ref 7–52)
ANION GAP SERPL CALCULATED.3IONS-SCNC: 7 MMOL/L (ref 4–13)
AST SERPL W P-5'-P-CCNC: 13 U/L (ref 13–39)
BASOPHILS # BLD AUTO: 0.03 THOUSANDS/ÂΜL (ref 0–0.1)
BASOPHILS NFR BLD AUTO: 1 % (ref 0–1)
BILIRUB SERPL-MCNC: 0.43 MG/DL (ref 0.2–1)
BUN SERPL-MCNC: 16 MG/DL (ref 5–25)
CALCIUM SERPL-MCNC: 9.4 MG/DL (ref 8.4–10.2)
CHLORIDE SERPL-SCNC: 103 MMOL/L (ref 96–108)
CHOLEST SERPL-MCNC: 158 MG/DL (ref ?–200)
CO2 SERPL-SCNC: 28 MMOL/L (ref 21–32)
CREAT SERPL-MCNC: 0.69 MG/DL (ref 0.6–1.3)
EOSINOPHIL # BLD AUTO: 0.12 THOUSAND/ÂΜL (ref 0–0.61)
EOSINOPHIL NFR BLD AUTO: 2 % (ref 0–6)
ERYTHROCYTE [DISTWIDTH] IN BLOOD BY AUTOMATED COUNT: 12.4 % (ref 11.6–15.1)
GFR SERPL CREATININE-BSD FRML MDRD: 113 ML/MIN/1.73SQ M
GLUCOSE P FAST SERPL-MCNC: 95 MG/DL (ref 65–99)
HCT VFR BLD AUTO: 37.3 % (ref 34.8–46.1)
HDLC SERPL-MCNC: 54 MG/DL
HGB BLD-MCNC: 12.1 G/DL (ref 11.5–15.4)
IMM GRANULOCYTES # BLD AUTO: 0.02 THOUSAND/UL (ref 0–0.2)
IMM GRANULOCYTES NFR BLD AUTO: 0 % (ref 0–2)
LDLC SERPL CALC-MCNC: 92 MG/DL (ref 0–100)
LYMPHOCYTES # BLD AUTO: 1.7 THOUSANDS/ÂΜL (ref 0.6–4.47)
LYMPHOCYTES NFR BLD AUTO: 28 % (ref 14–44)
MCH RBC QN AUTO: 29.4 PG (ref 26.8–34.3)
MCHC RBC AUTO-ENTMCNC: 32.4 G/DL (ref 31.4–37.4)
MCV RBC AUTO: 91 FL (ref 82–98)
MONOCYTES # BLD AUTO: 0.35 THOUSAND/ÂΜL (ref 0.17–1.22)
MONOCYTES NFR BLD AUTO: 6 % (ref 4–12)
NEUTROPHILS # BLD AUTO: 3.76 THOUSANDS/ÂΜL (ref 1.85–7.62)
NEUTS SEG NFR BLD AUTO: 63 % (ref 43–75)
NRBC BLD AUTO-RTO: 0 /100 WBCS
PLATELET # BLD AUTO: 228 THOUSANDS/UL (ref 149–390)
PMV BLD AUTO: 9.9 FL (ref 8.9–12.7)
POTASSIUM SERPL-SCNC: 4 MMOL/L (ref 3.5–5.3)
PROT SERPL-MCNC: 7.1 G/DL (ref 6.4–8.4)
RBC # BLD AUTO: 4.11 MILLION/UL (ref 3.81–5.12)
SODIUM SERPL-SCNC: 138 MMOL/L (ref 135–147)
TRIGL SERPL-MCNC: 58 MG/DL (ref ?–150)
TSH SERPL DL<=0.05 MIU/L-ACNC: 1.39 UIU/ML (ref 0.45–4.5)
VIT B12 SERPL-MCNC: 1402 PG/ML (ref 180–914)
WBC # BLD AUTO: 5.98 THOUSAND/UL (ref 4.31–10.16)

## 2025-07-14 PROCEDURE — 80053 COMPREHEN METABOLIC PANEL: CPT

## 2025-07-14 PROCEDURE — 82607 VITAMIN B-12: CPT

## 2025-07-14 PROCEDURE — 82306 VITAMIN D 25 HYDROXY: CPT

## 2025-07-14 PROCEDURE — 36415 COLL VENOUS BLD VENIPUNCTURE: CPT

## 2025-07-14 PROCEDURE — 85025 COMPLETE CBC W/AUTO DIFF WBC: CPT

## 2025-07-14 PROCEDURE — 80061 LIPID PANEL: CPT

## 2025-07-14 PROCEDURE — 84443 ASSAY THYROID STIM HORMONE: CPT

## 2025-07-14 NOTE — TELEPHONE ENCOUNTER
Patient calling to discuss lab results from today 7/14/25. Patient is concerned with abnormal vitamin B12 levels. Patient states she is leaving the country after tomorrow and would like to know if she needs to do anything or  any medication before she leaves. Patient contact number is [phone].

## 2025-07-14 NOTE — TELEPHONE ENCOUNTER
Called and spoke with patient to advise that labs have not been reviewed and we will reach out once provider reviews

## 2025-08-05 ENCOUNTER — OFFICE VISIT (OUTPATIENT)
Dept: FAMILY MEDICINE CLINIC | Facility: CLINIC | Age: 35
End: 2025-08-05
Payer: COMMERCIAL

## 2025-08-05 VITALS
HEIGHT: 64 IN | HEART RATE: 85 BPM | DIASTOLIC BLOOD PRESSURE: 80 MMHG | OXYGEN SATURATION: 98 % | TEMPERATURE: 97.6 F | SYSTOLIC BLOOD PRESSURE: 110 MMHG | RESPIRATION RATE: 16 BRPM | WEIGHT: 132 LBS | BODY MASS INDEX: 22.53 KG/M2

## 2025-08-05 DIAGNOSIS — Z12.4 SCREENING FOR CERVICAL CANCER: ICD-10-CM

## 2025-08-05 DIAGNOSIS — Z00.00 ROUTINE MEDICAL EXAM: Primary | ICD-10-CM

## 2025-08-05 DIAGNOSIS — E55.9 VITAMIN D DEFICIENCY: ICD-10-CM

## 2025-08-05 PROCEDURE — 99395 PREV VISIT EST AGE 18-39: CPT | Performed by: FAMILY MEDICINE

## 2025-08-05 RX ORDER — ACETAMINOPHEN 160 MG
2000 TABLET,DISINTEGRATING ORAL DAILY
Qty: 90 CAPSULE | Refills: 0 | Status: SHIPPED | OUTPATIENT
Start: 2025-08-05